# Patient Record
Sex: FEMALE | Race: WHITE | Employment: OTHER | URBAN - NONMETROPOLITAN AREA
[De-identification: names, ages, dates, MRNs, and addresses within clinical notes are randomized per-mention and may not be internally consistent; named-entity substitution may affect disease eponyms.]

---

## 2017-01-16 ENCOUNTER — TELEPHONE (OUTPATIENT)
Dept: FAMILY MEDICINE CLINIC | Facility: CLINIC | Age: 72
End: 2017-01-16

## 2017-01-16 NOTE — TELEPHONE ENCOUNTER
Calling the patient- we need a urine specimen. We do not what to treat with if we do not know what is showing in the urine.    The patient is really upset because she is caring for her brother currently who had his bladder removed and she is up at the [facility]

## 2017-01-17 ENCOUNTER — NURSE ONLY (OUTPATIENT)
Dept: FAMILY MEDICINE CLINIC | Facility: CLINIC | Age: 72
End: 2017-01-17

## 2017-01-17 DIAGNOSIS — R30.0 DYSURIA: Primary | ICD-10-CM

## 2017-01-17 LAB
MULTISTIX LOT#: NORMAL NUMERIC
PH, URINE: 6 (ref 4.5–8)
SPECIFIC GRAVITY: 1.01 (ref 1–1.03)
URINE-COLOR: YELLOW
UROBILINOGEN,SEMI-QN: 0.2 MG/DL (ref 0–1.9)

## 2017-01-17 PROCEDURE — 87186 SC STD MICRODIL/AGAR DIL: CPT | Performed by: FAMILY MEDICINE

## 2017-01-17 PROCEDURE — 87088 URINE BACTERIA CULTURE: CPT | Performed by: FAMILY MEDICINE

## 2017-01-17 PROCEDURE — 87086 URINE CULTURE/COLONY COUNT: CPT | Performed by: FAMILY MEDICINE

## 2017-01-17 PROCEDURE — 81003 URINALYSIS AUTO W/O SCOPE: CPT | Performed by: FAMILY MEDICINE

## 2017-01-17 RX ORDER — CEPHALEXIN 500 MG/1
500 CAPSULE ORAL 3 TIMES DAILY
Qty: 21 CAPSULE | Refills: 0 | Status: SHIPPED | OUTPATIENT
Start: 2017-01-17 | End: 2017-01-24

## 2017-01-25 RX ORDER — GABAPENTIN 400 MG/1
CAPSULE ORAL
Qty: 60 CAPSULE | Refills: 0 | Status: SHIPPED | OUTPATIENT
Start: 2017-01-25 | End: 2017-03-06

## 2017-01-31 ENCOUNTER — OFFICE VISIT (OUTPATIENT)
Dept: FAMILY MEDICINE CLINIC | Facility: CLINIC | Age: 72
End: 2017-01-31

## 2017-01-31 ENCOUNTER — HOSPITAL ENCOUNTER (OUTPATIENT)
Dept: GENERAL RADIOLOGY | Age: 72
Discharge: HOME | End: 2017-01-31
Attending: FAMILY MEDICINE
Payer: MEDICARE

## 2017-01-31 VITALS
HEIGHT: 64 IN | WEIGHT: 220.25 LBS | DIASTOLIC BLOOD PRESSURE: 80 MMHG | BODY MASS INDEX: 37.6 KG/M2 | TEMPERATURE: 98 F | SYSTOLIC BLOOD PRESSURE: 124 MMHG

## 2017-01-31 DIAGNOSIS — M48.061 LUMBAR SPINAL STENOSIS: Primary | ICD-10-CM

## 2017-01-31 DIAGNOSIS — M48.061 LUMBAR SPINAL STENOSIS: ICD-10-CM

## 2017-01-31 PROCEDURE — 72110 X-RAY EXAM L-2 SPINE 4/>VWS: CPT

## 2017-01-31 PROCEDURE — 99213 OFFICE O/P EST LOW 20 MIN: CPT | Performed by: FAMILY MEDICINE

## 2017-01-31 RX ORDER — OMEPRAZOLE 40 MG/1
CAPSULE, DELAYED RELEASE ORAL
Qty: 180 CAPSULE | Refills: 0 | OUTPATIENT
Start: 2017-01-31

## 2017-01-31 NOTE — PROGRESS NOTES
Cami Parks is a 66year old female. Patient presents with: Other: lower back pain since 1/17/17, pt injured back-wants to make sure there is no injury otherwise she will go see back doc. ...legs fall asleep since 1/17 with any pressure      HPI:   P TONSILLECTOMY      CHOLECYSTECTOMY      UPPER GI ENDOSCOPY,EXAM  2011    Comment Joy, GERD     Family History   Problem Relation Age of Onset   • Cancer Father      father  of colon cancer at age 61   • Heart Disease Mother      Mother  at a

## 2017-01-31 NOTE — PROGRESS NOTES
Quick Note:    Notify x-ray shows arthritic changes and postoperative changes. The hardware appears to be in good position.   Recommend evaluation by Dr. Sara Shell at THE [facility] if [facility] would like to get a another opinion  ______

## 2017-02-04 ENCOUNTER — TELEPHONE (OUTPATIENT)
Dept: FAMILY MEDICINE CLINIC | Facility: CLINIC | Age: 72
End: 2017-02-04

## 2017-02-04 NOTE — TELEPHONE ENCOUNTER
KYMBERLY HAD ANOTHER EPISODE LAST NIGHT, HER LEGS ALMOST WENT OUT FROM UNDER HER AND SHE FELT LIKE SHE HAD 2 HEADS, SHE GOT HER SELF CALMED DOWN, SHE JUST WOKE UP AND FEELS A LITTLE BIT BETTER BUT %, SHE WAS WONDERING WHAT ELSE COULD BE DONE, SHE DOES MANN

## 2017-02-04 NOTE — TELEPHONE ENCOUNTER
I agree. If it happens again she should go to the emergency room so tests could be run to try to get to the bottom of it.

## 2017-02-06 ENCOUNTER — OFFICE VISIT (OUTPATIENT)
Dept: FAMILY MEDICINE CLINIC | Facility: CLINIC | Age: 72
End: 2017-02-06

## 2017-02-06 ENCOUNTER — LAB ENCOUNTER (OUTPATIENT)
Dept: LAB | Age: 72
End: 2017-02-06
Attending: FAMILY MEDICINE
Payer: MEDICARE

## 2017-02-06 ENCOUNTER — MED REC SCAN ONLY (OUTPATIENT)
Dept: FAMILY MEDICINE CLINIC | Facility: CLINIC | Age: 72
End: 2017-02-06

## 2017-02-06 VITALS
BODY MASS INDEX: 37.22 KG/M2 | HEART RATE: 68 BPM | HEIGHT: 64 IN | WEIGHT: 218 LBS | TEMPERATURE: 98 F | RESPIRATION RATE: 16 BRPM | SYSTOLIC BLOOD PRESSURE: 110 MMHG | DIASTOLIC BLOOD PRESSURE: 78 MMHG

## 2017-02-06 DIAGNOSIS — R53.83 OTHER FATIGUE: ICD-10-CM

## 2017-02-06 DIAGNOSIS — R53.83 OTHER FATIGUE: Primary | ICD-10-CM

## 2017-02-06 DIAGNOSIS — M48.061 SPINAL STENOSIS OF LUMBAR REGION: ICD-10-CM

## 2017-02-06 LAB
ALBUMIN SERPL-MCNC: 4.2 G/DL (ref 3.5–4.8)
ALP LIVER SERPL-CCNC: 103 U/L (ref 55–142)
ALT SERPL-CCNC: 29 U/L (ref 14–54)
AST SERPL-CCNC: 19 U/L (ref 15–41)
BASOPHILS # BLD AUTO: 0.05 X10(3) UL (ref 0–0.1)
BASOPHILS NFR BLD AUTO: 0.7 %
BILIRUB SERPL-MCNC: 0.4 MG/DL (ref 0.1–2)
BUN BLD-MCNC: 20 MG/DL (ref 8–20)
CALCIUM BLD-MCNC: 9.1 MG/DL (ref 8.3–10.3)
CHLORIDE: 107 MMOL/L (ref 101–111)
CO2: 30 MMOL/L (ref 22–32)
CREAT BLD-MCNC: 0.95 MG/DL (ref 0.55–1.02)
EOSINOPHIL # BLD AUTO: 0.12 X10(3) UL (ref 0–0.3)
EOSINOPHIL NFR BLD AUTO: 1.8 %
ERYTHROCYTE [DISTWIDTH] IN BLOOD BY AUTOMATED COUNT: 14.4 % (ref 11.5–16)
GLUCOSE BLD-MCNC: 89 MG/DL (ref 70–99)
HCT VFR BLD AUTO: 44.8 % (ref 34–50)
HGB BLD-MCNC: 14.9 G/DL (ref 12–16)
IMMATURE GRANULOCYTE COUNT: 0.01 X10(3) UL (ref 0–1)
IMMATURE GRANULOCYTE RATIO %: 0.1 %
LYMPHOCYTES # BLD AUTO: 1.99 X10(3) UL (ref 0.9–4)
LYMPHOCYTES NFR BLD AUTO: 29.3 %
M PROTEIN MFR SERPL ELPH: 7.6 G/DL (ref 6.1–8.3)
MCH RBC QN AUTO: 31.4 PG (ref 27–33.2)
MCHC RBC AUTO-ENTMCNC: 33.3 G/DL (ref 31–37)
MCV RBC AUTO: 94.3 FL (ref 81–100)
MONOCYTES # BLD AUTO: 0.58 X10(3) UL (ref 0.1–0.6)
MONOCYTES NFR BLD AUTO: 8.5 %
NEUTROPHIL ABS PRELIM: 4.05 X10 (3) UL (ref 1.3–6.7)
NEUTROPHILS # BLD AUTO: 4.05 X10(3) UL (ref 1.3–6.7)
NEUTROPHILS NFR BLD AUTO: 59.6 %
PLATELET # BLD AUTO: 276 10(3)UL (ref 150–450)
POTASSIUM SERPL-SCNC: 5.3 MMOL/L (ref 3.6–5.1)
RBC # BLD AUTO: 4.75 X10(6)UL (ref 3.8–5.1)
RED CELL DISTRIBUTION WIDTH-SD: 50.1 FL (ref 35.1–46.3)
SED RATE-ML: 9 MM/HR (ref 0–25)
SODIUM SERPL-SCNC: 141 MMOL/L (ref 136–144)
TSI SER-ACNC: 2.19 MIU/ML (ref 0.35–5.5)
WBC # BLD AUTO: 6.8 X10(3) UL (ref 4–13)

## 2017-02-06 PROCEDURE — 85652 RBC SED RATE AUTOMATED: CPT

## 2017-02-06 PROCEDURE — 99214 OFFICE O/P EST MOD 30 MIN: CPT | Performed by: FAMILY MEDICINE

## 2017-02-06 PROCEDURE — 84443 ASSAY THYROID STIM HORMONE: CPT

## 2017-02-06 PROCEDURE — 36415 COLL VENOUS BLD VENIPUNCTURE: CPT

## 2017-02-06 PROCEDURE — 80053 COMPREHEN METABOLIC PANEL: CPT

## 2017-02-06 PROCEDURE — 85025 COMPLETE CBC W/AUTO DIFF WBC: CPT

## 2017-02-06 RX ORDER — A/SINGAPORE/GP1908/2015 IVR-180 (H1N1) (AN A/MICHIGAN/45/2015 (H1N1)PDM09-LIKE VIRUS), A/HONG KONG/4801/2014, NYMC X-263B (H3N2) (AN A/HONG KONG/4801/2014-LIKE VIRUS), AND B/BRISBANE/60/2008, WILD TYPE (A B/BRISBANE/60/2008-LIKE VIRUS) 15; 15; 15 UG/.5ML; UG/.5ML; UG/.5ML
INJECTION, SUSPENSION INTRAMUSCULAR
Refills: 0 | COMMUNITY
Start: 2016-11-30 | End: 2017-02-14 | Stop reason: ALTCHOICE

## 2017-02-06 NOTE — PROGRESS NOTES
Yanelis Puente is a 66year old female. Patient presents with: Other: chronic back pain, sx have been gettign worst in th epasst 3 weeks. . room 1      HPI:   Patient complains of increasing  back pain. She was seen last week.   X-rays revealed postoper Heart Disease Mother      Mother  at age 80 of CAD    • Cancer Paternal Grandfather      Rectal cancer        Social History:    Smoking Status: Never Smoker                      Smokeless Status: Never Used                        Alcohol Use: Yes (Automated) [E]    Meds & Refills for this Visit:  No prescriptions requested or ordered in this encounter       Imaging & Consults:  None

## 2017-02-07 ENCOUNTER — TELEPHONE (OUTPATIENT)
Dept: FAMILY MEDICINE CLINIC | Facility: CLINIC | Age: 72
End: 2017-02-07

## 2017-02-07 NOTE — PROGRESS NOTES
Quick Note:    All of her labs including CBC, thyroid, chemistry profile and inflammatory markers are normal. Recommend she make an appointment with Dr. Marge Cordero regarding her back pain.  ______

## 2017-02-17 ENCOUNTER — TELEPHONE (OUTPATIENT)
Dept: FAMILY MEDICINE CLINIC | Facility: CLINIC | Age: 72
End: 2017-02-17

## 2017-02-24 RX ORDER — SODIUM CHLORIDE 9 MG/ML
INJECTION, SOLUTION INTRAVENOUS CONTINUOUS
Status: CANCELLED | OUTPATIENT
Start: 2017-02-24

## 2017-02-24 RX ORDER — DIAZEPAM 5 MG/1
10 TABLET ORAL
Status: CANCELLED | OUTPATIENT
Start: 2017-02-25 | End: 2017-02-25

## 2017-02-27 ENCOUNTER — HOSPITAL ENCOUNTER (OUTPATIENT)
Dept: CT IMAGING | Facility: HOSPITAL | Age: 72
Discharge: HOME | End: 2017-02-27
Attending: PHYSICIAN ASSISTANT
Payer: MEDICARE

## 2017-02-27 ENCOUNTER — APPOINTMENT (OUTPATIENT)
Dept: LAB | Facility: HOSPITAL | Age: 72
End: 2017-02-27
Attending: PHYSICIAN ASSISTANT
Payer: MEDICARE

## 2017-02-27 ENCOUNTER — HOSPITAL ENCOUNTER (OUTPATIENT)
Dept: GENERAL RADIOLOGY | Facility: HOSPITAL | Age: 72
Discharge: HOME | End: 2017-02-27
Attending: PHYSICIAN ASSISTANT
Payer: MEDICARE

## 2017-02-27 VITALS
SYSTOLIC BLOOD PRESSURE: 123 MMHG | BODY MASS INDEX: 37.56 KG/M2 | WEIGHT: 220 LBS | HEART RATE: 72 BPM | TEMPERATURE: 98 F | OXYGEN SATURATION: 93 % | RESPIRATION RATE: 16 BRPM | HEIGHT: 64 IN | DIASTOLIC BLOOD PRESSURE: 72 MMHG

## 2017-02-27 VITALS
OXYGEN SATURATION: 100 % | RESPIRATION RATE: 16 BRPM | DIASTOLIC BLOOD PRESSURE: 86 MMHG | HEART RATE: 75 BPM | SYSTOLIC BLOOD PRESSURE: 145 MMHG

## 2017-02-27 DIAGNOSIS — M51.36 DDD (DEGENERATIVE DISC DISEASE), LUMBAR: ICD-10-CM

## 2017-02-27 DIAGNOSIS — Z98.1 S/P LUMBAR SPINAL FUSION: ICD-10-CM

## 2017-02-27 DIAGNOSIS — M48.061 LUMBAR STENOSIS: ICD-10-CM

## 2017-02-27 DIAGNOSIS — M48.061 LUMBAR STENOSIS: Primary | ICD-10-CM

## 2017-02-27 LAB
INR BLD: 0.88 (ref 0.89–1.12)
PSA SERPL DL<=0.01 NG/ML-MCNC: 12.2 SECONDS (ref 12.3–14.8)

## 2017-02-27 PROCEDURE — 36415 COLL VENOUS BLD VENIPUNCTURE: CPT

## 2017-02-27 PROCEDURE — 72132 CT LUMBAR SPINE W/DYE: CPT

## 2017-02-27 PROCEDURE — 85610 PROTHROMBIN TIME: CPT

## 2017-02-27 PROCEDURE — 62304 MYELOGRAPHY LUMBAR INJECTION: CPT | Performed by: RADIOLOGY

## 2017-02-27 RX ORDER — SODIUM CHLORIDE 9 MG/ML
INJECTION, SOLUTION INTRAVENOUS CONTINUOUS
Status: DISCONTINUED | OUTPATIENT
Start: 2017-02-27 | End: 2017-03-03

## 2017-02-27 RX ORDER — DIAZEPAM 5 MG/1
10 TABLET ORAL
Status: COMPLETED | OUTPATIENT
Start: 2017-02-27 | End: 2017-02-27

## 2017-02-27 RX ORDER — DIAZEPAM 5 MG/1
TABLET ORAL
Status: COMPLETED
Start: 2017-02-27 | End: 2017-02-27

## 2017-02-27 RX ADMIN — DIAZEPAM 10 MG: 5 TABLET ORAL at 09:59:00

## 2017-03-06 RX ORDER — GABAPENTIN 400 MG/1
CAPSULE ORAL
Qty: 60 CAPSULE | Refills: 0 | Status: SHIPPED | OUTPATIENT
Start: 2017-03-06 | End: 2017-03-06

## 2017-03-07 RX ORDER — GABAPENTIN 400 MG/1
CAPSULE ORAL
Qty: 180 CAPSULE | Refills: 0 | Status: SHIPPED | OUTPATIENT
Start: 2017-03-07 | End: 2017-05-02

## 2017-03-17 ENCOUNTER — MED REC SCAN ONLY (OUTPATIENT)
Dept: FAMILY MEDICINE CLINIC | Facility: CLINIC | Age: 72
End: 2017-03-17

## 2017-03-25 ENCOUNTER — TELEPHONE (OUTPATIENT)
Dept: FAMILY MEDICINE CLINIC | Facility: CLINIC | Age: 72
End: 2017-03-25

## 2017-03-25 NOTE — TELEPHONE ENCOUNTER
Pt is scheduled for an appointment scheduled Monday but wants to know what she can take OTC.  C/O cough, stuffiness, hoars, green phlegm  Advised Dr. Fab Chicas: v/o Mucinex D, Nasal saline, and ibuprofen as needed  Patient notified and verbalized understanding

## 2017-03-27 ENCOUNTER — OFFICE VISIT (OUTPATIENT)
Dept: FAMILY MEDICINE CLINIC | Facility: CLINIC | Age: 72
End: 2017-03-27

## 2017-03-27 VITALS
WEIGHT: 221 LBS | BODY MASS INDEX: 37.73 KG/M2 | OXYGEN SATURATION: 96 % | TEMPERATURE: 99 F | HEIGHT: 64 IN | HEART RATE: 94 BPM | SYSTOLIC BLOOD PRESSURE: 110 MMHG | DIASTOLIC BLOOD PRESSURE: 70 MMHG

## 2017-03-27 DIAGNOSIS — J01.10 ACUTE NON-RECURRENT FRONTAL SINUSITIS: Primary | ICD-10-CM

## 2017-03-27 PROCEDURE — 99213 OFFICE O/P EST LOW 20 MIN: CPT | Performed by: FAMILY MEDICINE

## 2017-03-27 RX ORDER — AMOXICILLIN AND CLAVULANATE POTASSIUM 875; 125 MG/1; MG/1
1 TABLET, FILM COATED ORAL 2 TIMES DAILY
Qty: 20 TABLET | Refills: 0 | Status: SHIPPED | OUTPATIENT
Start: 2017-03-27 | End: 2017-04-06

## 2017-03-27 RX ORDER — METHYLPREDNISOLONE 4 MG/1
TABLET ORAL
Qty: 1 KIT | Refills: 0 | Status: SHIPPED | OUTPATIENT
Start: 2017-03-27 | End: 2017-06-06 | Stop reason: ALTCHOICE

## 2017-03-27 NOTE — PROGRESS NOTES
Clement Hdez is a 66year old female. Patient presents with: Other: sneezing, green/yellow mucus, head/chest congestion, cough,a lot of soreness from coughing, feels very warm a lot. ..started on 3/21 and got very bad on 3/26. Amy Mac pt has been taking Tuss Mother  at age 80 of CAD    • Cancer Paternal Grandfather      Rectal cancer        Social History:    Smoking Status: Never Smoker                      Smokeless Status: Never Used                        Alcohol Use: Yes           0.0 oz/week purulent appearing nasal drainage from the sinus ostia. No orders of the defined types were placed in this encounter.        Meds & Refills for this Visit:  Signed Prescriptions Disp Refills    Amoxicillin-Pot Clavulanate 875-125 MG Oral Tab 20 tablet 0

## 2017-04-04 RX ORDER — GABAPENTIN 400 MG/1
CAPSULE ORAL
Qty: 60 CAPSULE | Refills: 0 | OUTPATIENT
Start: 2017-04-04

## 2017-05-02 RX ORDER — GABAPENTIN 400 MG/1
CAPSULE ORAL
Qty: 60 CAPSULE | Refills: 0 | OUTPATIENT
Start: 2017-05-02

## 2017-05-02 RX ORDER — GABAPENTIN 400 MG/1
CAPSULE ORAL
Qty: 180 CAPSULE | Refills: 0 | Status: SHIPPED | OUTPATIENT
Start: 2017-05-02 | End: 2017-07-18

## 2017-05-02 NOTE — TELEPHONE ENCOUNTER
patient is requesting a quantity increase of her gabapentin- she would like to get #120 at a time if possible, Wes should be contacting us regarding refill

## 2017-06-06 ENCOUNTER — OFFICE VISIT (OUTPATIENT)
Dept: FAMILY MEDICINE CLINIC | Facility: CLINIC | Age: 72
End: 2017-06-06

## 2017-06-06 VITALS
TEMPERATURE: 98 F | DIASTOLIC BLOOD PRESSURE: 76 MMHG | BODY MASS INDEX: 38.09 KG/M2 | SYSTOLIC BLOOD PRESSURE: 120 MMHG | WEIGHT: 223.13 LBS | HEART RATE: 80 BPM | HEIGHT: 64 IN | OXYGEN SATURATION: 95 %

## 2017-06-06 DIAGNOSIS — Z13.31 DEPRESSION SCREENING: ICD-10-CM

## 2017-06-06 DIAGNOSIS — Z00.00 ENCOUNTER FOR ANNUAL HEALTH EXAMINATION: Primary | ICD-10-CM

## 2017-06-06 PROCEDURE — G0439 PPPS, SUBSEQ VISIT: HCPCS | Performed by: FAMILY MEDICINE

## 2017-06-06 PROCEDURE — G0444 DEPRESSION SCREEN ANNUAL: HCPCS | Performed by: FAMILY MEDICINE

## 2017-06-06 NOTE — PATIENT INSTRUCTIONS
Amy Bergman's SCREENING SCHEDULE   Tests on this list are recommended by your physician but may not be covered, or covered at this frequency, by your insurer. Please check with your insurance carrier before scheduling to verify coverage.    SHEKHAR years- more often if abnormal Colonoscopy,5 Years due on 07/22/2018 Update [facility] if applicable    Flex Sigmoidoscopy Screen  Covered every 5 years No results found for this or any previous visit. No flowsheet data found.      Fecal Occult Blood 06/03/16  -PNEUMOCOCCAL VACC, 13 [facility]    Please get once after your 65th birthday    Pneumococcal 23 (Pneumovax)  Covered Once after 65 No orders found for this or any previous visit.  Please get once after your 65th birthday    Hepatitis B for Moderate/H

## 2017-06-06 NOTE — PROGRESS NOTES
HPI:   Amandeep Serrano is a 67year old female who presents for a Medicare Subsequent Annual Wellness visit (Pt already had Initial Annual Wellness).     No complaints    Her last annual assessment has been over 1 year: Annual Physical due on 06/03/2017 grandfather; Heart Disease in her mother. SOCIAL HISTORY:   She  reports that she has never smoked. She has never used smokeless tobacco. She reports that she drinks alcohol. She reports that she does not use illicit drugs.      REVIEW OF SYSTEMS:   GENER examination    Depression screening  -     Depression Screening (Medicare, Annual) []       Ms. Sowmya Valentine does not currently take aspirin. We discussed the risks and benefits of aspirin therapy.    Mariah Ross is unable to use daily aspirin therapy Fo afford your medications?: Yes    Hearing Problems?: No     Functional Status     Hearing Problems?: No    Vision Problems? : No    Difficulty walking?: Yes (back pain)    Difficulty dressing or bathing?: No    Problems with daily activities? : No    Memory Disease Screening     LDL Annually LDL CHOLESTROL (mg/dL)   Date Value   05/23/2014 138*        EKG - w/ Initial Preventative Physical Exam only, or if medically necessary Electrocardiogram date       Colorectal Cancer Screening      Colonoscopy Screen jory in the same house as a HepB virus carrier   Homosexual men   Illicit injectable drug abusers     Tetanus Toxoid  Only covered with a cut with metal- TD and TDaP Not covered by Medicare Part B No vaccine history found This may be covered with your prescript

## 2017-06-07 ENCOUNTER — MED REC SCAN ONLY (OUTPATIENT)
Dept: FAMILY MEDICINE CLINIC | Facility: CLINIC | Age: 72
End: 2017-06-07

## 2017-06-08 ENCOUNTER — TELEPHONE (OUTPATIENT)
Dept: FAMILY MEDICINE CLINIC | Facility: CLINIC | Age: 72
End: 2017-06-08

## 2017-06-08 DIAGNOSIS — N63.10 BREAST MASS, RIGHT: Primary | ICD-10-CM

## 2017-06-08 NOTE — TELEPHONE ENCOUNTER
Fax received from [phone] [place] [address] [address] stating \"Notification of incomplete order. Pt due for merrill diag & R US. Can we get a order for Diag Merrill mammo and Right US. Pt due for Merrill. \"  Right breast ultrasound and 3-D Breast sonography order was sent with Dx: F/u

## 2017-06-09 ENCOUNTER — MED REC SCAN ONLY (OUTPATIENT)
Dept: FAMILY MEDICINE CLINIC | Facility: CLINIC | Age: 72
End: 2017-06-09

## 2017-06-14 ENCOUNTER — TELEPHONE (OUTPATIENT)
Dept: FAMILY MEDICINE CLINIC | Facility: CLINIC | Age: 72
End: 2017-06-14

## 2017-06-14 NOTE — TELEPHONE ENCOUNTER
KYMBERLY WAS WONDERING IF SHE COULD STILL GET AN ULCER ON HER MEDICATION: OMEPRAZOLE 40 MG Oral Capsule Delayed Release       She is having irritation. She takes her Omeprazole daily. Once in awhile she will take BID  In her esophagus area it feels irritated.  [place]

## 2017-06-14 NOTE — TELEPHONE ENCOUNTER
She really needs to be seen by GI especially if she is having symptoms despite taking the omeprazole every day.

## 2017-06-14 NOTE — TELEPHONE ENCOUNTER
I called the patient and advised   She advised that she wants to find a doctor in [facility] or [place]. The drive is too far for [place] and she doesn't drive in congested areas. She would need to find someone to drive her.    She is going to try the medicine

## 2017-07-03 ENCOUNTER — TELEPHONE (OUTPATIENT)
Dept: FAMILY MEDICINE CLINIC | Facility: CLINIC | Age: 72
End: 2017-07-03

## 2017-07-03 NOTE — TELEPHONE ENCOUNTER
Pt has a question about her mammogram results and a test that they want her to have. Would like to speak to RN.

## 2017-07-03 NOTE — TELEPHONE ENCOUNTER
Calling the patient-     Friday she went to [facility] for her mammogram and they want her to have a biopsy of the right breast.   She has had to have repeat mammograms every 6 months and they state that nothing changes     The radiologist is recommending this ti

## 2017-07-06 NOTE — TELEPHONE ENCOUNTER
Notify radiologist recommends eval by surgeon for breast biopsy. Rec eval by Dr. Neha Hawley. V/o Dr. Junior Ruth. Left message for patient to call back.

## 2017-07-06 NOTE — TELEPHONE ENCOUNTER
Patient advised of below and verbalizes understanding. States they are planning to do the biopsy at [facility]. She will have results sent here.

## 2017-07-08 ENCOUNTER — TELEPHONE (OUTPATIENT)
Dept: FAMILY MEDICINE CLINIC | Facility: CLINIC | Age: 72
End: 2017-07-08

## 2017-07-08 DIAGNOSIS — R92.8 ABNORMAL MAMMOGRAM OF RIGHT BREAST: Primary | ICD-10-CM

## 2017-07-08 NOTE — TELEPHONE ENCOUNTER
CAN YOU PLEASE FAX AN ORDER FOR A BREAST BIOPSY TO [facility] IN [place] FAX NUMBER [phone]. THEY WANT TO DO THE BIOPSY ON Wednesday.

## 2017-07-15 ENCOUNTER — TELEPHONE (OUTPATIENT)
Dept: FAMILY MEDICINE CLINIC | Facility: CLINIC | Age: 72
End: 2017-07-15

## 2017-07-15 NOTE — TELEPHONE ENCOUNTER
Wants to know if we have received her biopsy results? Can wait until next week, just wants to make sure we receive them.

## 2017-07-17 ENCOUNTER — TELEPHONE (OUTPATIENT)
Dept: FAMILY MEDICINE CLINIC | Facility: CLINIC | Age: 72
End: 2017-07-17

## 2017-07-17 NOTE — TELEPHONE ENCOUNTER
The patient is looking for her biopsy results. .   She had at [facility] and we had to send the order for the biopsy.

## 2017-07-17 NOTE — TELEPHONE ENCOUNTER
I believe it showed carcinoma in situ but need to get a copy of the report to be sure before calling her back.

## 2017-07-17 NOTE — TELEPHONE ENCOUNTER
Calling [facility] for another copy of the biopsy results  [phone]    Left message for medical records.

## 2017-07-18 RX ORDER — GABAPENTIN 400 MG/1
CAPSULE ORAL
Qty: 180 CAPSULE | Refills: 0 | Status: SHIPPED | OUTPATIENT
Start: 2017-07-18 | End: 2017-10-16

## 2017-07-18 RX ORDER — OMEPRAZOLE 40 MG/1
CAPSULE, DELAYED RELEASE ORAL
Qty: 180 CAPSULE | Refills: 0 | Status: SHIPPED | OUTPATIENT
Start: 2017-07-18 | End: 2017-10-16

## 2017-07-18 NOTE — TELEPHONE ENCOUNTER
Deniz Stacy from [facility] faxed her results, please look for them.

## 2017-07-18 NOTE — TELEPHONE ENCOUNTER
Calling the patient to advise that the pathology was benign and she can resume her annual mammograms     Patient verbalized her understanding

## 2017-07-18 NOTE — TELEPHONE ENCOUNTER
Calling [facility] medical records-  I did not receive the biopsy results.    Had to leave a message again-     Calling to follow up with the patient so she knows that I am still waiting  She advised that she will call as well

## 2017-08-23 ENCOUNTER — TELEPHONE (OUTPATIENT)
Dept: FAMILY MEDICINE CLINIC | Facility: CLINIC | Age: 72
End: 2017-08-23

## 2017-08-23 RX ORDER — PREDNISONE 20 MG/1
20 TABLET ORAL 2 TIMES DAILY
Qty: 14 TABLET | Refills: 0 | Status: SHIPPED | OUTPATIENT
Start: 2017-08-23 | End: 2017-08-30

## 2017-08-23 NOTE — TELEPHONE ENCOUNTER
Pt got into some poison ivy and wants to know if she can get a prednisone pack? If so, please call into [facility] in [place], if not, call pt.

## 2017-09-18 ENCOUNTER — TELEPHONE (OUTPATIENT)
Dept: FAMILY MEDICINE CLINIC | Facility: CLINIC | Age: 72
End: 2017-09-18

## 2017-09-18 NOTE — TELEPHONE ENCOUNTER
Dr [facility]   The patient needs to see Dr Travon Betancourt

## 2017-09-19 NOTE — TELEPHONE ENCOUNTER
Calling the patient to advise the disc is ready  She advised that she will go this afternoon to pick this up

## 2017-09-19 NOTE — TELEPHONE ENCOUNTER
I spoke with the patient and advised that I called [facility] and left a message about needing to make a disc for her breast images     She is scheduled to see dr Joaquin Seip this Friday   Future Appointments  Date Time Provider Janie Moulton   9/22/2017 2:30 PM Ma

## 2017-09-22 ENCOUNTER — OFFICE VISIT (OUTPATIENT)
Dept: FAMILY MEDICINE CLINIC | Facility: CLINIC | Age: 72
End: 2017-09-22

## 2017-09-22 VITALS — BODY MASS INDEX: 38 KG/M2 | DIASTOLIC BLOOD PRESSURE: 80 MMHG | WEIGHT: 223 LBS | SYSTOLIC BLOOD PRESSURE: 124 MMHG

## 2017-09-22 DIAGNOSIS — R92.8 ABNORMAL MAMMOGRAM OF RIGHT BREAST: Primary | ICD-10-CM

## 2017-09-22 PROCEDURE — 99204 OFFICE O/P NEW MOD 45 MIN: CPT | Performed by: SURGERY

## 2017-09-22 NOTE — PROGRESS NOTES
Palpable-NO    MSBE-YES    AGE MENARCHE-14  AGE MENOPAUSE-43  TOTAL-28      AGE AT 1ST PREGNANCY-16  BCP- 11          HRT- 0    ETOH-OCCASIONAL  TOBACCO-NONE  CAFFEINE-3-5 CUPS A DAY  FAMILY HX OF BREAST,UTERINE,OR OVARIAN CA-SISTER POSSIBLE BREAST AN Sodium (ALEVE) 220 MG Oral Cap Take  by mouth 2 (two) times daily. Disp:  Rfl:      No current facility-administered medications on file prior to visit.    [de-identified]  Family History   Problem Relation Age of Onset   • Cancer Father      father  of colon can hernias  LYMPHATIC: no axillary , supraclavicular or inguinal lymphadenopathy  EXTREMITIES: no cyanosis, clubbing or edema, no atrophy, full ROM  Breasts no masses right or left axilla no masses neck no masses  STUDIES:     Appointment on 02/27/2017   Comp

## 2017-09-25 ENCOUNTER — TELEPHONE (OUTPATIENT)
Dept: FAMILY MEDICINE CLINIC | Facility: CLINIC | Age: 72
End: 2017-09-25

## 2017-09-25 ENCOUNTER — TELEPHONE (OUTPATIENT)
Dept: SURGERY | Facility: CLINIC | Age: 72
End: 2017-09-25

## 2017-09-25 ENCOUNTER — MED REC SCAN ONLY (OUTPATIENT)
Dept: FAMILY MEDICINE CLINIC | Facility: CLINIC | Age: 72
End: 2017-09-25

## 2017-09-25 DIAGNOSIS — D05.01 NEOPLASM OF RIGHT BREAST, PRIMARY TUMOR STAGING CATEGORY TIS: LOBULAR CARCINOMA IN SITU (LCIS): Primary | ICD-10-CM

## 2017-09-25 NOTE — TELEPHONE ENCOUNTER
I spoke to Rafa and we were able to schedule the pt for 10/6/17 as another pt r/s his procedure.  Pt aware and v/u. matty

## 2017-09-25 NOTE — TELEPHONE ENCOUNTER
Received call from [facility] surgery scheduler [facility] doesn't have availability on 10/2/17. Per [facility] there is time on 9/27/17. [facility] aware I will call the pt and call her back.     I spoke to the pt and she is aware the [facility] doesn't have availability on 10/

## 2017-09-27 ENCOUNTER — TELEPHONE (OUTPATIENT)
Dept: FAMILY MEDICINE CLINIC | Facility: CLINIC | Age: 72
End: 2017-09-27

## 2017-10-05 ENCOUNTER — TELEPHONE (OUTPATIENT)
Dept: MAMMOGRAPHY | Facility: HOSPITAL | Age: 72
End: 2017-10-05

## 2017-10-05 ENCOUNTER — ANESTHESIA EVENT (OUTPATIENT)
Dept: SURGERY | Facility: HOSPITAL | Age: 72
End: 2017-10-05
Payer: MEDICARE

## 2017-10-05 NOTE — TELEPHONE ENCOUNTER
Spoke with patient regarding needle Localization process of breast for lumpectomy due tomorrow. Procedure explained and all questions answered. Pt to be escorted via W[facility]C through [address] [facility] [address] [facility]. Pt verbalized understanding.

## 2017-10-05 NOTE — ANESTHESIA PREPROCEDURE EVALUATION
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                Airway      Mallampati: III  Mouth opening: >3 FB  TM distance: 4 - 6 cm  Neck ROM: full Cardiovascular    Cardiovascular exam normal.  Rhythm: regular  Rate: normal  (-) murmur   Dental

## 2017-10-06 ENCOUNTER — ANESTHESIA (OUTPATIENT)
Dept: SURGERY | Facility: HOSPITAL | Age: 72
End: 2017-10-06
Payer: MEDICARE

## 2017-10-06 ENCOUNTER — HOSPITAL ENCOUNTER (OUTPATIENT)
Dept: MAMMOGRAPHY | Facility: HOSPITAL | Age: 72
Discharge: HOME | End: 2017-10-06
Attending: SURGERY
Payer: MEDICARE

## 2017-10-06 ENCOUNTER — SURGERY (OUTPATIENT)
Age: 72
End: 2017-10-06

## 2017-10-06 ENCOUNTER — HOSPITAL ENCOUNTER (OUTPATIENT)
Facility: HOSPITAL | Age: 72
Setting detail: HOSPITAL OUTPATIENT SURGERY
Discharge: HOME | End: 2017-10-06
Attending: SURGERY | Admitting: SURGERY
Payer: MEDICARE

## 2017-10-06 VITALS
OXYGEN SATURATION: 96 % | HEART RATE: 75 BPM | BODY MASS INDEX: 36.99 KG/M2 | TEMPERATURE: 98 F | DIASTOLIC BLOOD PRESSURE: 71 MMHG | SYSTOLIC BLOOD PRESSURE: 127 MMHG | WEIGHT: 216.69 LBS | HEIGHT: 64 IN | RESPIRATION RATE: 20 BRPM

## 2017-10-06 DIAGNOSIS — D05.01 NEOPLASM OF RIGHT BREAST, PRIMARY TUMOR STAGING CATEGORY TIS: LOBULAR CARCINOMA IN SITU (LCIS): ICD-10-CM

## 2017-10-06 PROCEDURE — 88305 TISSUE EXAM BY PATHOLOGIST: CPT | Performed by: SURGERY

## 2017-10-06 PROCEDURE — 19281 PERQ DEVICE BREAST 1ST IMAG: CPT | Performed by: SURGERY

## 2017-10-06 PROCEDURE — 0HBT0ZX EXCISION OF RIGHT BREAST, OPEN APPROACH, DIAGNOSTIC: ICD-10-PCS | Performed by: SURGERY

## 2017-10-06 PROCEDURE — 76098 X-RAY EXAM SURGICAL SPECIMEN: CPT | Performed by: SURGERY

## 2017-10-06 RX ORDER — ONDANSETRON 2 MG/ML
4 INJECTION INTRAMUSCULAR; INTRAVENOUS ONCE AS NEEDED
Status: DISCONTINUED | OUTPATIENT
Start: 2017-10-06 | End: 2017-10-06

## 2017-10-06 RX ORDER — BUPIVACAINE HYDROCHLORIDE 5 MG/ML
INJECTION, SOLUTION EPIDURAL; INTRACAUDAL AS NEEDED
Status: DISCONTINUED | OUTPATIENT
Start: 2017-10-06 | End: 2017-10-06 | Stop reason: HOSPADM

## 2017-10-06 RX ORDER — HYDROCODONE BITARTRATE AND ACETAMINOPHEN 5; 325 MG/1; MG/1
1 TABLET ORAL AS NEEDED
Status: DISCONTINUED | OUTPATIENT
Start: 2017-10-06 | End: 2017-10-06

## 2017-10-06 RX ORDER — METOCLOPRAMIDE HYDROCHLORIDE 5 MG/ML
10 INJECTION INTRAMUSCULAR; INTRAVENOUS AS NEEDED
Status: DISCONTINUED | OUTPATIENT
Start: 2017-10-06 | End: 2017-10-06

## 2017-10-06 RX ORDER — NALOXONE HYDROCHLORIDE 0.4 MG/ML
80 INJECTION, SOLUTION INTRAMUSCULAR; INTRAVENOUS; SUBCUTANEOUS AS NEEDED
Status: DISCONTINUED | OUTPATIENT
Start: 2017-10-06 | End: 2017-10-06

## 2017-10-06 RX ORDER — CEFAZOLIN SODIUM 1 G/3ML
INJECTION, POWDER, FOR SOLUTION INTRAMUSCULAR; INTRAVENOUS
Status: DISCONTINUED | OUTPATIENT
Start: 2017-10-06 | End: 2017-10-06 | Stop reason: HOSPADM

## 2017-10-06 RX ORDER — HYDROCODONE BITARTRATE AND ACETAMINOPHEN 5; 325 MG/1; MG/1
1 TABLET ORAL EVERY 6 HOURS PRN
Qty: 30 TABLET | Refills: 0 | Status: SHIPPED | OUTPATIENT
Start: 2017-10-06 | End: 2017-10-27 | Stop reason: ALTCHOICE

## 2017-10-06 RX ORDER — HYDROCODONE BITARTRATE AND ACETAMINOPHEN 5; 325 MG/1; MG/1
2 TABLET ORAL AS NEEDED
Status: DISCONTINUED | OUTPATIENT
Start: 2017-10-06 | End: 2017-10-06

## 2017-10-06 RX ORDER — DIAZEPAM 5 MG/1
5 TABLET ORAL AS NEEDED
Status: DISCONTINUED | OUTPATIENT
Start: 2017-10-06 | End: 2017-10-06 | Stop reason: HOSPADM

## 2017-10-06 RX ORDER — HYDROMORPHONE HYDROCHLORIDE 1 MG/ML
0.4 INJECTION, SOLUTION INTRAMUSCULAR; INTRAVENOUS; SUBCUTANEOUS EVERY 5 MIN PRN
Status: DISCONTINUED | OUTPATIENT
Start: 2017-10-06 | End: 2017-10-06

## 2017-10-06 RX ORDER — HYDROMORPHONE HYDROCHLORIDE 1 MG/ML
INJECTION, SOLUTION INTRAMUSCULAR; INTRAVENOUS; SUBCUTANEOUS
Status: COMPLETED
Start: 2017-10-06 | End: 2017-10-06

## 2017-10-06 RX ORDER — SODIUM CHLORIDE, SODIUM LACTATE, POTASSIUM CHLORIDE, CALCIUM CHLORIDE 600; 310; 30; 20 MG/100ML; MG/100ML; MG/100ML; MG/100ML
INJECTION, SOLUTION INTRAVENOUS CONTINUOUS
Status: DISCONTINUED | OUTPATIENT
Start: 2017-10-06 | End: 2017-10-06

## 2017-10-06 RX ORDER — LIDOCAINE HYDROCHLORIDE AND EPINEPHRINE 10; 10 MG/ML; UG/ML
INJECTION, SOLUTION INFILTRATION; PERINEURAL AS NEEDED
Status: DISCONTINUED | OUTPATIENT
Start: 2017-10-06 | End: 2017-10-06 | Stop reason: HOSPADM

## 2017-10-06 NOTE — BRIEF OP NOTE
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

## 2017-10-06 NOTE — H&P
Reason for Visit     Consult Dr Karine Castro.  Consult for abnl mammo and stereotactic bx-R breast   Progress Notes        Palpable-NO     MSBE-YES     AGE MENARCHE-14  AGE MENOPAUSE-43  TOTAL-28       AGE AT 1ST PREGNANCY-16  BCP- 11          HRT- 0    MOUTH TWICE DAILY Disp: 180 capsule Rfl: 0   GABAPENTIN 400 MG Oral Cap TAKE ONE CAPSULE BY MOUTH TWICE DAILY Disp: 180 capsule Rfl: 0   Naproxen Sodium (ALEVE) 220 MG Oral Cap Take  by mouth 2 (two) times daily.  Disp:  Rfl:       No current facility-admin clear to auscultation, no rales , rhonchi or wheezing  CARDIOVASCULAR: RRR, murmur negative  ABDOMEN: normal active BS, soft, nondistended  no HSM, no masses or hernias  LYMPHATIC: no axillary , supraclavicular or inguinal lymphadenopathy  EXTREMITIES: no

## 2017-10-06 NOTE — OPERATIVE REPORT
[facility]    PATIENT'S NAME: Alejo, [place] FLORENTIN   ATTENDING PHYSICIAN: Davin Couch D.O.   OPERATING PHYSICIAN: Davin Couch D.O.   PATIENT ACCOUNT#:   [de-identified]    LOCATION:  [address] [place] [address] [address]  MEDICAL RECORD #:   [id number]       D applied. The patient was transported from the operative suite to recovery in stable condition.     Dictated By Elisa Harris D.O.  d: 10/06/2017 11:09:34  t: 10/06/2017 13:49:24  [facility] [id number]  /    cc: Brandi Cruz D.O.

## 2017-10-06 NOTE — ANESTHESIA POSTPROCEDURE EVALUATION
[facility] Patient Status:  Hospital Outpatient Surgery   Age/Gender 67year old female MRN [id number]   [facility] SURGERY Attending Frida Gould, [facility] Day # 0 PCP Cyndi Escalona DO       Anesthesia Post-op

## 2017-10-06 NOTE — IMAGING NOTE
Assisted Dr. Flora Cuellar with needle localization for breast for lumpectomy. Procedure explained and all questions answered. Pt verbalized understanding. Emotional support provided and pt tolerated procedure well with minimal discomfort.  Gauze dressing placed a

## 2017-10-09 ENCOUNTER — TELEPHONE (OUTPATIENT)
Dept: FAMILY MEDICINE CLINIC | Facility: CLINIC | Age: 72
End: 2017-10-09

## 2017-10-09 NOTE — TELEPHONE ENCOUNTER
HAD PROCEDURE Friday WITH DR Munir Hernandez STILL FEELS VERY WEAK AND FATIGUED. COULD IT STILL BE EFFECTS FROM ANESTHESIA?   PLEASE ADVISE

## 2017-10-09 NOTE — TELEPHONE ENCOUNTER
Patient had procedure last Friday. Her instructions state that she is to follow up with Dr Viri Galvan in 5 days.   There are no available appointments

## 2017-10-09 NOTE — TELEPHONE ENCOUNTER
Pt states she tried to go out into the yard and do some work but feels weak and fatigued and wanted to know if it would be from the anesthesia? Pt aware the anesthesia would be out of her system by now. Pt aware it could be from the pain medication.  Pt ethan

## 2017-10-09 NOTE — TELEPHONE ENCOUNTER
I spoke to the pt and she is aware Dr Gisselle Herrera is out of the office this week and will be back in office on 10/17/17.  Pt v/u and made an appt for that day. matty    Future Appointments  Date Time Provider Janie Moulton   10/17/2017 1:15 PM ELI Glynn

## 2017-10-17 ENCOUNTER — OFFICE VISIT (OUTPATIENT)
Dept: FAMILY MEDICINE CLINIC | Facility: CLINIC | Age: 72
End: 2017-10-17

## 2017-10-17 VITALS
HEART RATE: 68 BPM | DIASTOLIC BLOOD PRESSURE: 78 MMHG | SYSTOLIC BLOOD PRESSURE: 118 MMHG | TEMPERATURE: 98 F | RESPIRATION RATE: 16 BRPM | HEIGHT: 64 IN | BODY MASS INDEX: 38.07 KG/M2 | WEIGHT: 223 LBS

## 2017-10-17 DIAGNOSIS — N63.10 BREAST MASS, RIGHT: Primary | ICD-10-CM

## 2017-10-17 PROCEDURE — 99024 POSTOP FOLLOW-UP VISIT: CPT | Performed by: SURGERY

## 2017-10-17 RX ORDER — OMEPRAZOLE 40 MG/1
CAPSULE, DELAYED RELEASE ORAL
Qty: 180 CAPSULE | Refills: 0 | Status: SHIPPED | OUTPATIENT
Start: 2017-10-17 | End: 2017-12-22

## 2017-10-17 RX ORDER — GABAPENTIN 400 MG/1
CAPSULE ORAL
Qty: 180 CAPSULE | Refills: 0 | Status: SHIPPED | OUTPATIENT
Start: 2017-10-17 | End: 2017-12-22

## 2017-10-23 ENCOUNTER — TELEPHONE (OUTPATIENT)
Dept: FAMILY MEDICINE CLINIC | Facility: CLINIC | Age: 72
End: 2017-10-23

## 2017-10-23 NOTE — TELEPHONE ENCOUNTER
DR Kay Patel DID SURGERY 10/6/17 ON KYMBERLY'S RIGHT BREAST, SHE HAS A TRIANGLE SHAPE HOLE IN HER BREAST, IT IS LEAKING.   HE DID NOT PUT A DRAIN IN IT.  SHE DID [address] SPOKE WITH KYMBERLY, SHE TOLD HER TO USE STERILE BAND AIDS OVER IT UN

## 2017-10-23 NOTE — TELEPHONE ENCOUNTER
Continue to wash with soap and water daily. Cover with gauze. Office visit some time this week. V/o Dr. Cari Nichols  Patient advised and verbalizes understanding. Appointment scheduled.   Future Appointments  Date Time Provider Janie Moulton   10/27/2017

## 2017-10-23 NOTE — TELEPHONE ENCOUNTER
Started with small amount of drainage Friday. By Saturday draining had increased a lot. Covering with sterile pad after cleaning with peroxide. Drainage is blood-tinged. No odor. Had to change the dressing 3 times yesterday. No fevers.   Not warm to t

## 2017-10-23 NOTE — PROGRESS NOTES
Patient status post right breast needle localized biopsy for proliferative breast lesion final pathology demonstrated further LCIS no invasive component incision is healing well no evidence of erythema induration or infection plan follow-up as needed

## 2017-10-27 ENCOUNTER — OFFICE VISIT (OUTPATIENT)
Dept: FAMILY MEDICINE CLINIC | Facility: CLINIC | Age: 72
End: 2017-10-27

## 2017-10-27 VITALS
HEART RATE: 94 BPM | SYSTOLIC BLOOD PRESSURE: 120 MMHG | OXYGEN SATURATION: 96 % | BODY MASS INDEX: 38 KG/M2 | WEIGHT: 220.5 LBS | TEMPERATURE: 98 F | DIASTOLIC BLOOD PRESSURE: 80 MMHG

## 2017-10-27 DIAGNOSIS — N63.10 BREAST MASS, RIGHT: Primary | ICD-10-CM

## 2017-10-27 PROCEDURE — 99024 POSTOP FOLLOW-UP VISIT: CPT | Performed by: SURGERY

## 2017-10-27 RX ORDER — CEPHALEXIN 500 MG/1
500 CAPSULE ORAL 2 TIMES DAILY
Qty: 14 CAPSULE | Refills: 0 | Status: SHIPPED | OUTPATIENT
Start: 2017-10-27 | End: 2017-11-03 | Stop reason: ALTCHOICE

## 2017-10-27 NOTE — PROGRESS NOTES
Wound opened up with clear serous drainage 3 days ago has been continuing to drain a small amount each day she claims some increasing discomfort over the retroareolar area of the right breast.  She denies fever chills states her abdomen is normal states he

## 2017-11-02 ENCOUNTER — TELEPHONE (OUTPATIENT)
Dept: FAMILY MEDICINE CLINIC | Facility: CLINIC | Age: 72
End: 2017-11-02

## 2017-11-02 NOTE — TELEPHONE ENCOUNTER
Spoke to Dr. Yobany Grullon and advised of information, he states he would like patient to come into office on Tuesday for follow up.     Called and spoke to patient, offered appt for Tuesday in [place], she states that she does not drive that far an the lady that he

## 2017-11-02 NOTE — TELEPHONE ENCOUNTER
KYMBERLY HAD A SMALL HOLE THAT WAS LEAKING, THAT IS HEALED, BUT THERE IS NOW ONE ABOVE IT THAT IS LEAKING. THE FLUID IS AN JOVANNY COLOR, THERE IS A LITTLE BIT OF BLOOD. SHE HAS GONE THROUGH 3 SHIRTS AND ABOUT 4 SQUARES OF GAUZE.

## 2017-11-02 NOTE — TELEPHONE ENCOUNTER
Returned phone call to patient she states that she is taking her last dose of Keflex tonight, area on the right brest has closed from Friday, now is light pink in color.  Today about 11 am she has noticed another hole under the previous one this does have a

## 2017-11-03 ENCOUNTER — OFFICE VISIT (OUTPATIENT)
Dept: FAMILY MEDICINE CLINIC | Facility: CLINIC | Age: 72
End: 2017-11-03

## 2017-11-03 VITALS
HEART RATE: 106 BPM | TEMPERATURE: 98 F | DIASTOLIC BLOOD PRESSURE: 80 MMHG | OXYGEN SATURATION: 97 % | SYSTOLIC BLOOD PRESSURE: 124 MMHG

## 2017-11-03 DIAGNOSIS — N63.10 BREAST MASS, RIGHT: Primary | ICD-10-CM

## 2017-11-03 PROCEDURE — 99024 POSTOP FOLLOW-UP VISIT: CPT | Performed by: SURGERY

## 2017-11-03 NOTE — PROGRESS NOTES
Patient continues to have serous drainage out of the lateral aspect of her right breast incision I have inspected the wounds carefully and debrided the necrotic skin this is allowed additional clear serous fluid to emanate from the wound there is no eviden

## 2017-12-22 ENCOUNTER — TELEPHONE (OUTPATIENT)
Dept: FAMILY MEDICINE CLINIC | Facility: CLINIC | Age: 72
End: 2017-12-22

## 2017-12-22 RX ORDER — GABAPENTIN 400 MG/1
400 CAPSULE ORAL 2 TIMES DAILY
Qty: 180 CAPSULE | Refills: 0 | Status: SHIPPED | OUTPATIENT
Start: 2017-12-22 | End: 2018-04-09

## 2017-12-22 RX ORDER — OMEPRAZOLE 40 MG/1
CAPSULE, DELAYED RELEASE ORAL
Qty: 180 CAPSULE | Refills: 0 | Status: SHIPPED | OUTPATIENT
Start: 2017-12-22 | End: 2018-04-09

## 2017-12-22 NOTE — TELEPHONE ENCOUNTER
90 day refill of OMEPRAZOLE 40 MG Oral Capsule Delayed  and  GABAPENTIN 400 MG Oral Cap   [facility] in [place]     She is asking to get 90 days worth to help her get through until her new prescription program starts

## 2018-01-25 ENCOUNTER — TELEPHONE (OUTPATIENT)
Dept: FAMILY MEDICINE CLINIC | Facility: CLINIC | Age: 73
End: 2018-01-25

## 2018-01-25 DIAGNOSIS — M79.646 THUMB PAIN, UNSPECIFIED LATERALITY: ICD-10-CM

## 2018-01-25 DIAGNOSIS — M25.539 PAIN IN WRIST, UNSPECIFIED LATERALITY: Primary | ICD-10-CM

## 2018-01-25 NOTE — TELEPHONE ENCOUNTER
Olya Lloyd called back and advised that he saw the patient yesterday c/o wrist and thumb arthritic changes. She is having a very hard time grasping anything and was wanting some therapy.      He was asking if we could place an order for that    I advised that I

## 2018-01-26 ENCOUNTER — TELEPHONE (OUTPATIENT)
Dept: FAMILY MEDICINE CLINIC | Facility: CLINIC | Age: 73
End: 2018-01-26

## 2018-01-26 NOTE — TELEPHONE ENCOUNTER
THEY RECIEVED FAXED ORDER IN ERROR, IT WAS FAXED  [phone]---NEEDS TO GO  [phone]---IKTY TO REFAX TO CORRECT #

## 2018-01-26 NOTE — TELEPHONE ENCOUNTER
I am not sure what happened because the cove sheet for the order has the correct fax number    refaxing

## 2018-02-02 ENCOUNTER — LAB ENCOUNTER (OUTPATIENT)
Dept: LAB | Age: 73
End: 2018-02-02
Attending: FAMILY MEDICINE
Payer: MEDICARE

## 2018-02-02 ENCOUNTER — OFFICE VISIT (OUTPATIENT)
Dept: FAMILY MEDICINE CLINIC | Facility: CLINIC | Age: 73
End: 2018-02-02

## 2018-02-02 VITALS
HEIGHT: 64 IN | SYSTOLIC BLOOD PRESSURE: 110 MMHG | TEMPERATURE: 98 F | HEART RATE: 88 BPM | WEIGHT: 225 LBS | DIASTOLIC BLOOD PRESSURE: 76 MMHG | BODY MASS INDEX: 38.41 KG/M2

## 2018-02-02 DIAGNOSIS — R53.83 OTHER FATIGUE: ICD-10-CM

## 2018-02-02 DIAGNOSIS — R53.83 OTHER FATIGUE: Primary | ICD-10-CM

## 2018-02-02 LAB
ALBUMIN SERPL-MCNC: 4 G/DL (ref 3.5–4.8)
ALP LIVER SERPL-CCNC: 93 U/L (ref 55–142)
ALT SERPL-CCNC: 27 U/L (ref 14–54)
AST SERPL-CCNC: 18 U/L (ref 15–41)
BASOPHILS # BLD AUTO: 0.06 X10(3) UL (ref 0–0.1)
BASOPHILS NFR BLD AUTO: 1.1 %
BILIRUB SERPL-MCNC: 0.3 MG/DL (ref 0.1–2)
BUN BLD-MCNC: 16 MG/DL (ref 8–20)
CALCIUM BLD-MCNC: 8.9 MG/DL (ref 8.3–10.3)
CHLORIDE: 108 MMOL/L (ref 101–111)
CHOLEST SMN-MCNC: 214 MG/DL (ref ?–200)
CO2: 25 MMOL/L (ref 22–32)
CREAT BLD-MCNC: 0.96 MG/DL (ref 0.55–1.02)
EOSINOPHIL # BLD AUTO: 0.06 X10(3) UL (ref 0–0.3)
EOSINOPHIL NFR BLD AUTO: 1.1 %
ERYTHROCYTE [DISTWIDTH] IN BLOOD BY AUTOMATED COUNT: 14.1 % (ref 11.5–16)
GLUCOSE BLD-MCNC: 101 MG/DL (ref 70–99)
HCT VFR BLD AUTO: 43 % (ref 34–50)
HDLC SERPL-MCNC: 43 MG/DL (ref 45–?)
HDLC SERPL: 4.98 {RATIO} (ref ?–4.44)
HGB BLD-MCNC: 14.4 G/DL (ref 12–16)
IMMATURE GRANULOCYTE COUNT: 0.01 X10(3) UL (ref 0–1)
IMMATURE GRANULOCYTE RATIO %: 0.2 %
LDLC SERPL CALC-MCNC: 134 MG/DL (ref ?–130)
LYMPHOCYTES # BLD AUTO: 1.91 X10(3) UL (ref 0.9–4)
LYMPHOCYTES NFR BLD AUTO: 33.8 %
M PROTEIN MFR SERPL ELPH: 7.3 G/DL (ref 6.1–8.3)
MCH RBC QN AUTO: 30.5 PG (ref 27–33.2)
MCHC RBC AUTO-ENTMCNC: 33.5 G/DL (ref 31–37)
MCV RBC AUTO: 91.1 FL (ref 81–100)
MONOCYTES # BLD AUTO: 0.42 X10(3) UL (ref 0.1–0.6)
MONOCYTES NFR BLD AUTO: 7.4 %
NEUTROPHIL ABS PRELIM: 3.19 X10 (3) UL (ref 1.3–6.7)
NEUTROPHILS # BLD AUTO: 3.19 X10(3) UL (ref 1.3–6.7)
NEUTROPHILS NFR BLD AUTO: 56.4 %
NONHDLC SERPL-MCNC: 171 MG/DL (ref ?–130)
PLATELET # BLD AUTO: 243 10(3)UL (ref 150–450)
POTASSIUM SERPL-SCNC: 4.2 MMOL/L (ref 3.6–5.1)
RBC # BLD AUTO: 4.72 X10(6)UL (ref 3.8–5.1)
RED CELL DISTRIBUTION WIDTH-SD: 47.8 FL (ref 35.1–46.3)
SODIUM SERPL-SCNC: 141 MMOL/L (ref 136–144)
TRIGL SERPL-MCNC: 187 MG/DL (ref ?–150)
TSI SER-ACNC: 1.7 MIU/ML (ref 0.35–5.5)
VLDLC SERPL CALC-MCNC: 37 MG/DL (ref 5–40)
WBC # BLD AUTO: 5.7 X10(3) UL (ref 4–13)

## 2018-02-02 PROCEDURE — 80061 LIPID PANEL: CPT

## 2018-02-02 PROCEDURE — 36415 COLL VENOUS BLD VENIPUNCTURE: CPT | Performed by: FAMILY MEDICINE

## 2018-02-02 PROCEDURE — 84443 ASSAY THYROID STIM HORMONE: CPT

## 2018-02-02 PROCEDURE — 85025 COMPLETE CBC W/AUTO DIFF WBC: CPT

## 2018-02-02 PROCEDURE — 36415 COLL VENOUS BLD VENIPUNCTURE: CPT

## 2018-02-02 PROCEDURE — 80053 COMPREHEN METABOLIC PANEL: CPT

## 2018-02-02 PROCEDURE — 71046 X-RAY EXAM CHEST 2 VIEWS: CPT | Performed by: FAMILY MEDICINE

## 2018-02-02 PROCEDURE — 99214 OFFICE O/P EST MOD 30 MIN: CPT | Performed by: FAMILY MEDICINE

## 2018-02-02 NOTE — PROGRESS NOTES
Clovis Ang is a 67year old female. Patient presents with: Other: weakness and fatigue-all pt wants to do is sleep, will fall asleep watching tv. ...started approx 2 wks ago with the weakness. ... Erika Coy room1      HPI:   Patient complains of fatigue.   She of CAD    • Cancer Paternal Grandfather      Rectal cancer        Social History:  Smoking status: Never Smoker                                                              Smokeless tobacco: Never Used                      Alcohol use: Yes           0.0 o this encounter    Imaging & Consults:  None

## 2018-02-03 NOTE — PROGRESS NOTES
Notify lipids are at goal. Recheck in  12 months. Notify chemistry profile is unremarkable. Recheck in  12 months. Notify CBC normal. No evidence of anemia or of an acute infection. Notify TSH normal. Recheck in 1 year.   Recommend appointment next week

## 2018-03-03 ENCOUNTER — MED REC SCAN ONLY (OUTPATIENT)
Dept: FAMILY MEDICINE CLINIC | Facility: CLINIC | Age: 73
End: 2018-03-03

## 2018-03-07 ENCOUNTER — HOSPITAL ENCOUNTER (OUTPATIENT)
Dept: GENERAL RADIOLOGY | Age: 73
Discharge: HOME | End: 2018-03-07
Attending: FAMILY MEDICINE

## 2018-03-07 ENCOUNTER — OFFICE VISIT (OUTPATIENT)
Dept: FAMILY MEDICINE CLINIC | Facility: CLINIC | Age: 73
End: 2018-03-07

## 2018-03-07 ENCOUNTER — HOSPITAL ENCOUNTER (OUTPATIENT)
Dept: GENERAL RADIOLOGY | Age: 73
Discharge: HOME | End: 2018-03-07
Attending: FAMILY MEDICINE
Payer: MEDICARE

## 2018-03-07 VITALS
HEART RATE: 101 BPM | DIASTOLIC BLOOD PRESSURE: 70 MMHG | BODY MASS INDEX: 39 KG/M2 | TEMPERATURE: 98 F | WEIGHT: 228.25 LBS | SYSTOLIC BLOOD PRESSURE: 130 MMHG | OXYGEN SATURATION: 94 %

## 2018-03-07 DIAGNOSIS — M25.561 ACUTE PAIN OF BOTH KNEES: ICD-10-CM

## 2018-03-07 DIAGNOSIS — M25.562 ACUTE PAIN OF BOTH KNEES: ICD-10-CM

## 2018-03-07 DIAGNOSIS — M17.0 PRIMARY OSTEOARTHRITIS OF BOTH KNEES: Primary | ICD-10-CM

## 2018-03-07 PROCEDURE — 99213 OFFICE O/P EST LOW 20 MIN: CPT | Performed by: FAMILY MEDICINE

## 2018-03-07 PROCEDURE — 73560 X-RAY EXAM OF KNEE 1 OR 2: CPT | Performed by: FAMILY MEDICINE

## 2018-03-07 NOTE — PROGRESS NOTES
Marianne Fine is a 67year old female. Patient presents with: Other: bilateral knee pain--would like xray, has been doing physical therapy. ...room 1      HPI:   Patient has had long-standing history of knee pain.   She has received Synvisc in the past Rectal cancer        Social History:  Smoking status: Never Smoker                                                              Smokeless tobacco: Never Used                      Alcohol use: Yes           0.0 oz/week     Comment: rare-wine or beer

## 2018-03-09 ENCOUNTER — OFFICE VISIT (OUTPATIENT)
Dept: FAMILY MEDICINE CLINIC | Facility: CLINIC | Age: 73
End: 2018-03-09

## 2018-03-09 VITALS
DIASTOLIC BLOOD PRESSURE: 80 MMHG | HEART RATE: 89 BPM | TEMPERATURE: 98 F | OXYGEN SATURATION: 96 % | SYSTOLIC BLOOD PRESSURE: 112 MMHG

## 2018-03-09 DIAGNOSIS — M17.0 PRIMARY OSTEOARTHRITIS OF BOTH KNEES: Primary | ICD-10-CM

## 2018-03-09 PROCEDURE — 99213 OFFICE O/P EST LOW 20 MIN: CPT | Performed by: FAMILY MEDICINE

## 2018-03-09 PROCEDURE — 20610 DRAIN/INJ JOINT/BURSA W/O US: CPT | Performed by: FAMILY MEDICINE

## 2018-03-09 NOTE — PROGRESS NOTES
Amandeep Serrano is a 67year old female. Patient presents with: Other: LT knee Synvisc injection. ...room 1      HPI:   Patient has known osteoarthritis of both knees. The left is worse than the right.   Previously she has had Synvisc injections which ha Smoker                                                              Smokeless tobacco: Never Used                      Alcohol use: Yes           0.0 oz/week     Comment: rare-wine or beer       REVIEW OF SYSTEMS:   GENERAL HEALTH: feels well otherwise  SK

## 2018-04-09 RX ORDER — OMEPRAZOLE 40 MG/1
CAPSULE, DELAYED RELEASE ORAL
Qty: 180 CAPSULE | Refills: 0 | Status: SHIPPED | OUTPATIENT
Start: 2018-04-09 | End: 2018-07-06

## 2018-04-09 RX ORDER — GABAPENTIN 400 MG/1
400 CAPSULE ORAL 2 TIMES DAILY
Qty: 180 CAPSULE | Refills: 0 | Status: SHIPPED | OUTPATIENT
Start: 2018-04-09 | End: 2018-07-06

## 2018-04-09 NOTE — TELEPHONE ENCOUNTER
gabapentin 400 MG   Omeprazole 40 MG     Call to [facility]/PHARMACY #[id number]- [address], [address] [facility] [address] [facility] [phone], [phone]

## 2018-05-01 ENCOUNTER — MED REC SCAN ONLY (OUTPATIENT)
Dept: FAMILY MEDICINE CLINIC | Facility: CLINIC | Age: 73
End: 2018-05-01

## 2018-05-02 ENCOUNTER — TELEPHONE (OUTPATIENT)
Dept: FAMILY MEDICINE CLINIC | Facility: CLINIC | Age: 73
End: 2018-05-02

## 2018-05-02 NOTE — TELEPHONE ENCOUNTER
Has a friend with knee pain-her  psrescribed Pennsaid 2%- would this work for her? Use BID on knees. It is a NSAID solution. Patient has had the Synvisc injections previously and seemed to work for her.    Calling the patient- she advised that the

## 2018-05-03 NOTE — TELEPHONE ENCOUNTER
Calling the patient back- I advised. She doesn't think that she wants to try it- she was reading the side effects. She has a friend that is huge into essential oils and had her try something and it really helped.  So she may try that for awhile

## 2018-05-03 NOTE — TELEPHONE ENCOUNTER
It may or may not help. The only way to know is to try it. May be very expensive because there is no generics. I pended the order.

## 2018-06-04 ENCOUNTER — MED REC SCAN ONLY (OUTPATIENT)
Dept: FAMILY MEDICINE CLINIC | Facility: CLINIC | Age: 73
End: 2018-06-04

## 2018-06-11 ENCOUNTER — OFFICE VISIT (OUTPATIENT)
Dept: FAMILY MEDICINE CLINIC | Facility: CLINIC | Age: 73
End: 2018-06-11

## 2018-06-11 VITALS
DIASTOLIC BLOOD PRESSURE: 74 MMHG | TEMPERATURE: 98 F | HEIGHT: 64 IN | OXYGEN SATURATION: 95 % | BODY MASS INDEX: 38.29 KG/M2 | SYSTOLIC BLOOD PRESSURE: 116 MMHG | WEIGHT: 224.25 LBS | HEART RATE: 85 BPM

## 2018-06-11 DIAGNOSIS — M48.061 SPINAL STENOSIS OF LUMBAR REGION WITHOUT NEUROGENIC CLAUDICATION: ICD-10-CM

## 2018-06-11 DIAGNOSIS — Z13.31 DEPRESSION SCREENING: ICD-10-CM

## 2018-06-11 DIAGNOSIS — Z12.11 COLON CANCER SCREENING: ICD-10-CM

## 2018-06-11 DIAGNOSIS — Z00.00 ENCOUNTER FOR ANNUAL HEALTH EXAMINATION: Primary | ICD-10-CM

## 2018-06-11 PROCEDURE — G0444 DEPRESSION SCREEN ANNUAL: HCPCS | Performed by: FAMILY MEDICINE

## 2018-06-11 PROCEDURE — G0439 PPPS, SUBSEQ VISIT: HCPCS | Performed by: FAMILY MEDICINE

## 2018-06-11 NOTE — PATIENT INSTRUCTIONS
Amy Bergman's SCREENING SCHEDULE   Tests on this list are recommended by your physician but may not be covered, or covered at this frequency, by your insurer. Please check with your insurance carrier before scheduling to verify coverage.    SHEKHAR the following criteria:   • Men who are 73-68 years old and have smoked more than 100 cigarettes in their lifetime   • Anyone with a family history    Colorectal Cancer Screening  Covered up to Age 76     Colonoscopy Screen   Covered every 10 years- more o regularly   Immunizations      Influenza  Covered Annually No orders found for this or any previous visit.  Please get every year    Pneumococcal 13 (Prevnar)  Covered Once after 65   Orders placed or performed in visit on 06/03/16  -PNEUMOCOCCAL VACC, 13 V information from the [address] [place] [address] regarding Advance Directives.

## 2018-06-11 NOTE — PROGRESS NOTES
HPI:   Bryant Forrest is a 75year old female who presents for a Medicare Subsequent Annual Wellness visit (Pt already had Initial Annual Wellness).     No complaints         Fall/Risk Assessment   She has been screened for Falls and is low risk: Fall/R Labs:     Lab Results  Component Value Date   AST 18 02/02/2018   ALT 27 02/02/2018   CA 8.9 02/02/2018   ALB 4.0 02/02/2018   TSH 1.700 02/02/2018   CREATSERUM 0.96 02/02/2018    (H) 02/02/2018        CBC  (most recent labs)     Lab Results  Compon encounter: 64\". Weight as of this encounter: 224 lb 4 oz.     Medicare Hearing Assessment  (Required for AWV/SWV)    Whispered Voice       Visual Acuity                                Vaccination History     Immunization History   Administered Date(s) A section provided for quick review of chart, separate sheet to patient  [address] Internal Lab or Procedure External Lab or Procedure   Diabetes Screening      HbgA1C   Annually No results found for: A1C No flowsheet da Annually 10/1/2017 Please get every year    Pneumococcal 13 (Prevnar)  Covered Once after 65 06/03/2016 Please get once after your 65th birthday    Pneumococcal 23 (Pneumovax)  Covered Once after 65 09/01/2013 Please get once after your 65th birthday    He

## 2018-06-15 ENCOUNTER — TELEPHONE (OUTPATIENT)
Dept: FAMILY MEDICINE CLINIC | Facility: CLINIC | Age: 73
End: 2018-06-15

## 2018-06-15 NOTE — TELEPHONE ENCOUNTER
Calling the patient because Dr is out of town     She has  Been using hydrocortisone cream since Tuesday  Previously she was using the benadryl.      Maybe she will try both over the weekend  If she still having problems on Monday she will call back

## 2018-06-15 NOTE — TELEPHONE ENCOUNTER
HAS POISON IVY, WANTS PREDNISONE OR SOMETHING CALLED TO [facility] IN [place], SHE HAS BEEN USING HYDROCORTISONE CREAM & IT'S NOT WORKING

## 2018-06-18 ENCOUNTER — TELEPHONE (OUTPATIENT)
Dept: FAMILY MEDICINE CLINIC | Facility: CLINIC | Age: 73
End: 2018-06-18

## 2018-06-18 RX ORDER — PREDNISONE 20 MG/1
20 TABLET ORAL 2 TIMES DAILY
Qty: 14 TABLET | Refills: 0 | Status: SHIPPED | OUTPATIENT
Start: 2018-06-18 | End: 2018-06-25

## 2018-06-18 NOTE — TELEPHONE ENCOUNTER
Patient has been using Benadryl and hydrocortisone cream for about a week for her poison ivy and nothing is helping. She really didn't want to have to come in.   She is asking if Dr Rodrigo Hutchins will send something in?

## 2018-06-18 NOTE — TELEPHONE ENCOUNTER
POISON HANS IS NOT ANY BETTER, PLEASE SEND SOMETHING AS DISCUSSED Friday TO [facility] IN [facility]

## 2018-07-06 RX ORDER — OMEPRAZOLE 40 MG/1
CAPSULE, DELAYED RELEASE ORAL
Qty: 180 CAPSULE | Refills: 0 | Status: SHIPPED | OUTPATIENT
Start: 2018-07-06 | End: 2019-04-02

## 2018-07-06 RX ORDER — GABAPENTIN 400 MG/1
400 CAPSULE ORAL 2 TIMES DAILY
Qty: 180 CAPSULE | Refills: 0 | Status: SHIPPED | OUTPATIENT
Start: 2018-07-06 | End: 2018-10-19

## 2018-10-19 RX ORDER — GABAPENTIN 400 MG/1
400 CAPSULE ORAL 2 TIMES DAILY
Qty: 180 CAPSULE | Refills: 0 | Status: SHIPPED | OUTPATIENT
Start: 2018-10-19 | End: 2018-12-31

## 2018-10-30 ENCOUNTER — TELEPHONE (OUTPATIENT)
Dept: FAMILY MEDICINE CLINIC | Facility: CLINIC | Age: 73
End: 2018-10-30

## 2018-10-30 NOTE — TELEPHONE ENCOUNTER
I called the patient and advised that  is out of the office   She was asking if I could ask one of the other doctors     She wants to use the CBD oil to put on her joints to help with her arthritis.  She just wants to make sure that it will not interfere

## 2018-10-30 NOTE — TELEPHONE ENCOUNTER
Pt is interested in trying marijuana oil for her joints. Would it interfere with the med's she takes? Where would she buy it?

## 2018-11-09 ENCOUNTER — TELEPHONE (OUTPATIENT)
Dept: FAMILY MEDICINE CLINIC | Facility: CLINIC | Age: 73
End: 2018-11-09

## 2018-11-09 DIAGNOSIS — R82.90 ABNORMAL URINE ODOR: Primary | ICD-10-CM

## 2018-11-09 DIAGNOSIS — R30.0 DYSURIA: ICD-10-CM

## 2018-11-09 PROCEDURE — 81003 URINALYSIS AUTO W/O SCOPE: CPT | Performed by: FAMILY MEDICINE

## 2018-11-09 NOTE — TELEPHONE ENCOUNTER
Calling the patient-   Her urine smells strong, pubic pain.    She is pushing water, taking cranberry pills     She will drop a urine off tomorrow morning about 10am- she has a sterile cup from us at home

## 2018-11-10 ENCOUNTER — NURSE ONLY (OUTPATIENT)
Dept: FAMILY MEDICINE CLINIC | Facility: CLINIC | Age: 73
End: 2018-11-10

## 2018-11-10 DIAGNOSIS — R30.0 DYSURIA: ICD-10-CM

## 2018-11-10 DIAGNOSIS — R35.0 URINARY FREQUENCY: Primary | ICD-10-CM

## 2018-11-10 PROCEDURE — 87086 URINE CULTURE/COLONY COUNT: CPT | Performed by: INTERNAL MEDICINE

## 2018-11-23 ENCOUNTER — TELEPHONE (OUTPATIENT)
Dept: FAMILY MEDICINE CLINIC | Facility: CLINIC | Age: 73
End: 2018-11-23

## 2018-11-23 NOTE — TELEPHONE ENCOUNTER
Calling the patient- left shoulder pain and now it radiates into the left arm. She had been taking Ibuprofen  She has been using the heating pad  Using Bengay      I recommended that she not use the heat.    Alternate the Ibuprofen and the Tylenol  Using

## 2018-11-27 ENCOUNTER — OFFICE VISIT (OUTPATIENT)
Dept: FAMILY MEDICINE CLINIC | Facility: CLINIC | Age: 73
End: 2018-11-27
Payer: MEDICARE

## 2018-11-27 VITALS
WEIGHT: 224.38 LBS | HEIGHT: 64 IN | SYSTOLIC BLOOD PRESSURE: 130 MMHG | HEART RATE: 80 BPM | TEMPERATURE: 97 F | BODY MASS INDEX: 38.31 KG/M2 | RESPIRATION RATE: 12 BRPM | DIASTOLIC BLOOD PRESSURE: 82 MMHG

## 2018-11-27 DIAGNOSIS — M54.12 CERVICAL RADICULITIS: Primary | ICD-10-CM

## 2018-11-27 DIAGNOSIS — M25.512 ACUTE PAIN OF LEFT SHOULDER: ICD-10-CM

## 2018-11-27 PROCEDURE — 99214 OFFICE O/P EST MOD 30 MIN: CPT | Performed by: FAMILY MEDICINE

## 2018-11-27 RX ORDER — PREDNISONE 20 MG/1
20 TABLET ORAL 2 TIMES DAILY
Qty: 14 TABLET | Refills: 0 | Status: SHIPPED | OUTPATIENT
Start: 2018-11-27 | End: 2018-12-04
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
## 2018-12-31 ENCOUNTER — TELEPHONE (OUTPATIENT)
Dept: FAMILY MEDICINE CLINIC | Facility: CLINIC | Age: 73
End: 2018-12-31

## 2018-12-31 RX ORDER — GABAPENTIN 400 MG/1
400 CAPSULE ORAL 2 TIMES DAILY
Qty: 180 CAPSULE | Refills: 0 | Status: SHIPPED | OUTPATIENT
Start: 2018-12-31 | End: 2019-03-25

## 2019-01-21 ENCOUNTER — TELEPHONE (OUTPATIENT)
Dept: FAMILY MEDICINE CLINIC | Facility: CLINIC | Age: 74
End: 2019-01-21

## 2019-01-21 NOTE — TELEPHONE ENCOUNTER
Pt. Would like to get a pap done this year. She called Medicare and they said they will cover. Pt. Scheduled her wellness for June. She thought Dr. Monty Olivares said he would not do it with her wellness? Pt. Also would like to get a mammo. Pt.  Also has some questions

## 2019-01-21 NOTE — TELEPHONE ENCOUNTER
Calling the patient-    Future Appointments   Date Time Provider Janie Moulton   6/17/2019 10:00 AM Nahed Brito, DO [facility] [place]     I explained that he can not do a pap smear during the Wellness.    She called Medicare today and they expla

## 2019-01-21 NOTE — TELEPHONE ENCOUNTER
She is asking if she couldn't just do the cologuard?  Her previous colonoscopy in 2013 was normal.   I will address with Dr Ady Odonnell and let her know if that is an option and call her back       Please advise

## 2019-01-21 NOTE — TELEPHONE ENCOUNTER
Calling the patient back to advise-     She will address at the pap smear visit otherwise she will call if she will be in the area sooner

## 2019-02-11 ENCOUNTER — TELEPHONE (OUTPATIENT)
Dept: FAMILY MEDICINE CLINIC | Facility: CLINIC | Age: 74
End: 2019-02-11

## 2019-02-11 NOTE — TELEPHONE ENCOUNTER
Calling the patient-     Crease of her leg/abdomen pain. It isnot constant. It almost feels like she needs to have a bowl movement. Feels like a pulled muscle. She has had this for about a week intermittently. She has gas and some constipation problems.

## 2019-02-11 NOTE — TELEPHONE ENCOUNTER
KYMBERLY'S NEIGHBOR BROUGHT HER MIRALAX AND MILK OF MAGNESIA. KYMBERLY WANTED TO SPEAK WITH SHAYNA TO SEE IF SHE SHOULD GO AHEAD AND START TAKING THIS FOR HER POSSIBLE CONSTIPATION.  PLEASE CALL

## 2019-02-11 NOTE — TELEPHONE ENCOUNTER
Calling to advise that is it ok for her to start the Miralax and the MOM. I encouraged the patient to push the water- this is very important.

## 2019-02-11 NOTE — TELEPHONE ENCOUNTER
KYMBERLY DOESN'T KNOW IF SHE HAS A PULLED MUSCLE OR SOMETHING GOING ON WITH HER APPENDEX? SHE STARTED TO FEEL QUEEZY YESTERDAY. NO KNOWN FEVER. SHE IS EATING OKAY.

## 2019-02-13 ENCOUNTER — NURSE ONLY (OUTPATIENT)
Dept: FAMILY MEDICINE CLINIC | Facility: CLINIC | Age: 74
End: 2019-02-13
Payer: MEDICARE

## 2019-02-13 ENCOUNTER — TELEPHONE (OUTPATIENT)
Dept: FAMILY MEDICINE CLINIC | Facility: CLINIC | Age: 74
End: 2019-02-13

## 2019-02-13 DIAGNOSIS — R10.2 PELVIC PAIN: Primary | ICD-10-CM

## 2019-02-13 DIAGNOSIS — R11.0 NAUSEA: Primary | ICD-10-CM

## 2019-02-13 LAB
APPEARANCE: CLEAR
MULTISTIX LOT#: NORMAL NUMERIC
PH, URINE: 7 (ref 4.5–8)
SPECIFIC GRAVITY: 1.01 (ref 1–1.03)
URINE-COLOR: YELLOW
UROBILINOGEN,SEMI-QN: 0.2 MG/DL (ref 0–1.9)

## 2019-02-13 PROCEDURE — 87086 URINE CULTURE/COLONY COUNT: CPT | Performed by: FAMILY MEDICINE

## 2019-02-13 PROCEDURE — 81003 URINALYSIS AUTO W/O SCOPE: CPT | Performed by: FAMILY MEDICINE

## 2019-02-13 RX ORDER — CIPROFLOXACIN 250 MG/1
250 TABLET, FILM COATED ORAL 2 TIMES DAILY
Qty: 10 TABLET | Refills: 0 | Status: SHIPPED | OUTPATIENT
Start: 2019-02-13 | End: 2019-02-18

## 2019-02-13 NOTE — TELEPHONE ENCOUNTER
I had talked with the patient on 2/11/19 and all her symptoms sounded like constipation. I had sent the call to Dr Jenaro Rose but she did not respond and the patient at that time had called back and was going to try MOM, Miralax and push the fluids.      She i

## 2019-02-13 NOTE — TELEPHONE ENCOUNTER
PAIN IN RIGHT SIDE, DOWN IN GROIN AREA. PULLED MUSCLE OR APPENDICITIS? IT'S NOT CONSTIPATION. ITS CAUSING NAUSEA. CAN SHE BE SEEN TODAY.   PLEASE CALL

## 2019-02-13 NOTE — TELEPHONE ENCOUNTER
Calling Amy back to get some more information. I called the number back that was given in the phone note and it rang then went blank. Pt is having nausea, feels like she has to urinate all the time, pain in groin area-pt was constipated yesterday.

## 2019-02-13 NOTE — TELEPHONE ENCOUNTER
Patient is concerned about the Cipro medication. The disclosure information states that the medication can rupture or cause swelling of the tendons.   Patient has neuropathy of her feet and already has tendon swelling of her right wrist.    Is it okay to ta

## 2019-02-14 ENCOUNTER — HOSPITAL ENCOUNTER (OUTPATIENT)
Dept: CT IMAGING | Age: 74
Discharge: HOME | End: 2019-02-14
Attending: FAMILY MEDICINE
Payer: MEDICARE

## 2019-02-14 ENCOUNTER — TELEPHONE (OUTPATIENT)
Dept: FAMILY MEDICINE CLINIC | Facility: CLINIC | Age: 74
End: 2019-02-14

## 2019-02-14 ENCOUNTER — OFFICE VISIT (OUTPATIENT)
Dept: FAMILY MEDICINE CLINIC | Facility: CLINIC | Age: 74
End: 2019-02-14
Payer: MEDICARE

## 2019-02-14 VITALS
OXYGEN SATURATION: 100 % | TEMPERATURE: 97 F | RESPIRATION RATE: 12 BRPM | HEART RATE: 96 BPM | SYSTOLIC BLOOD PRESSURE: 128 MMHG | DIASTOLIC BLOOD PRESSURE: 80 MMHG | BODY MASS INDEX: 38.24 KG/M2 | WEIGHT: 224 LBS | HEIGHT: 64 IN

## 2019-02-14 DIAGNOSIS — R10.31 RIGHT LOWER QUADRANT ABDOMINAL PAIN: Primary | ICD-10-CM

## 2019-02-14 DIAGNOSIS — R10.31 PAIN, ABDOMINAL, RLQ: ICD-10-CM

## 2019-02-14 DIAGNOSIS — R10.31 PAIN, ABDOMINAL, RLQ: Primary | ICD-10-CM

## 2019-02-14 LAB — CREAT SERPL-MCNC: 0.8 MG/DL (ref 0.55–1.02)

## 2019-02-14 PROCEDURE — 74177 CT ABD & PELVIS W/CONTRAST: CPT | Performed by: FAMILY MEDICINE

## 2019-02-14 PROCEDURE — 82565 ASSAY OF CREATININE: CPT

## 2019-02-14 PROCEDURE — 99214 OFFICE O/P EST MOD 30 MIN: CPT | Performed by: FAMILY MEDICINE

## 2019-02-14 NOTE — TELEPHONE ENCOUNTER
Lynda 68 ON 3/5/19. DR LI'S OFFICE NEEDS WRITTEN REFERRAL AND LAST COLONOCOPY REPORT. SENT PRIOR TO CONSULT.

## 2019-02-14 NOTE — TELEPHONE ENCOUNTER
Pended referral to Dr. Mitchel Amor, unsure of diagnosis to associate. Routed to Dr. Nichole Gilmore.      Printed her colonoscopy report dated: 7/22/2013 - put in follow up folder to fax with the referral.

## 2019-02-14 NOTE — PROGRESS NOTES
Scheduled patient at 2pm in [facility] for CT scan. Contacted patient. Informed she has to arrive at 12:15 to start drinking the prep, NPO 2 hours prior to exam. Verbalizes understanding.

## 2019-02-14 NOTE — PROGRESS NOTES
Wade Araya is a 75year old female. Patient presents with: Other: RLQ  pain inrm. 1       HPI:   Pain to right inguinal area into right hip.no vomiting or diarrhea but has had nausea. No dysuria, fever, hematuria. Has had constipation.  No melena o of CAD    • Cancer Paternal Grandfather         Rectal cancer        Social History:  Social History    Tobacco Use      Smoking status: Never Smoker      Smokeless tobacco: Never Used    Alcohol use: No      Alcohol/week: 0.0 oz      Frequency: Never

## 2019-03-05 ENCOUNTER — MED REC SCAN ONLY (OUTPATIENT)
Dept: FAMILY MEDICINE CLINIC | Facility: CLINIC | Age: 74
End: 2019-03-05

## 2019-03-14 ENCOUNTER — TELEPHONE (OUTPATIENT)
Dept: FAMILY MEDICINE CLINIC | Facility: CLINIC | Age: 74
End: 2019-03-14

## 2019-03-14 NOTE — TELEPHONE ENCOUNTER
Called pt's home phone. Discussed phone message. States for the last 3 weeks she had had intermittent chest pressure and that is why the EGD was scheduled because she thought her hiatal hernia acting up again.  Had EGD on Monday, no significant findings

## 2019-03-14 NOTE — TELEPHONE ENCOUNTER
Pt. Called Qadirs office re: chest pressure. They informed her to call her PCP. Per Dr. Ashwini Henderson no EGD showed no cause and nissen was intact. FYI. Pt. Should be calling today if not we should f/u with pt. In next 20 min. Per Dr. Margret Rosa office.

## 2019-03-14 NOTE — TELEPHONE ENCOUNTER
Pt spoke with Chauncey Iglesias and scheduled office visit for tomorrow for chest pain that radiated to throat but has resolved s/p EGD Monday. Called patient at [phone], no answer, left message for patient to return phone call.

## 2019-03-14 NOTE — TELEPHONE ENCOUNTER
Discussed with Dr. Kyree Mulatni, instructed patient should try Mylanta or Maalox. If she develops worsening symptoms report to the ER otherwise see Dr. Rodrigo Hutchins in the office tomorrow. Contacted patient, notified of Dr. Anshul Styles response.  Verbalizes [employer]

## 2019-03-15 ENCOUNTER — OFFICE VISIT (OUTPATIENT)
Dept: FAMILY MEDICINE CLINIC | Facility: CLINIC | Age: 74
End: 2019-03-15
Payer: MEDICARE

## 2019-03-15 ENCOUNTER — TELEPHONE (OUTPATIENT)
Dept: FAMILY MEDICINE CLINIC | Facility: CLINIC | Age: 74
End: 2019-03-15

## 2019-03-15 VITALS
WEIGHT: 222.81 LBS | SYSTOLIC BLOOD PRESSURE: 116 MMHG | RESPIRATION RATE: 16 BRPM | BODY MASS INDEX: 38.04 KG/M2 | HEART RATE: 80 BPM | TEMPERATURE: 98 F | HEIGHT: 64 IN | DIASTOLIC BLOOD PRESSURE: 78 MMHG

## 2019-03-15 DIAGNOSIS — I20.8 OTHER FORMS OF ANGINA PECTORIS (HCC): Primary | ICD-10-CM

## 2019-03-15 PROCEDURE — 99214 OFFICE O/P EST MOD 30 MIN: CPT | Performed by: FAMILY MEDICINE

## 2019-03-15 NOTE — TELEPHONE ENCOUNTER
Patient needs a [facility] in [place]- if possible  Waiting for approval and will schedule at that time

## 2019-03-15 NOTE — PROGRESS NOTES
Veronia Gowers is a 75year old female. Patient presents with:  Chest Pain: Pt. is c/o pain in her chest and feels that it is her reflux. Pt. is also c/o SOB.  Pt. states she took Mylanta and that seemed to help but did not take med today      HPI:   EGD age 60   • Heart Disease Mother         Mother  at age 80 of CAD    • Cancer Paternal Grandfather         Rectal cancer        Social History:  Social History    Tobacco Use      Smoking status: Never Smoker      Smokeless tobacco: Never Used    Alcoho

## 2019-03-18 ENCOUNTER — TELEPHONE (OUTPATIENT)
Dept: FAMILY MEDICINE CLINIC | Facility: CLINIC | Age: 74
End: 2019-03-18

## 2019-03-18 NOTE — TELEPHONE ENCOUNTER
I called the patient and explained that we are still waiting for approval.     She advised that she woke up last night with a lot of chest pain and today she is very tired.    I explained that if that happens again- or if she develops more symptoms, etc. Sh

## 2019-03-18 NOTE — TELEPHONE ENCOUNTER
I explained we are waiting to hear back from the insurance on the stress test but she wanted to let us know she almost went to the ER last night because the pain was so bad.  KEYANA

## 2019-03-18 NOTE — TELEPHONE ENCOUNTER
----- Message from [employer] sent at 3/18/2019  4:25 PM CDT -----  Contact: PT  CALL PT, CHECKING STATUS ON LEXISCAN, IF IT HAS BEEN APPROVED YET?

## 2019-03-19 ENCOUNTER — TELEPHONE (OUTPATIENT)
Dept: FAMILY MEDICINE CLINIC | Facility: CLINIC | Age: 74
End: 2019-03-19

## 2019-03-19 NOTE — TELEPHONE ENCOUNTER
The stress test has been approved so I will call to schedule     The patient does not want to have to go to [place]. I will see if we can schedule in [place]. I got through to scheduling and they advised that the patient is already scheduled.    Fu

## 2019-03-20 ENCOUNTER — HOSPITAL ENCOUNTER (OUTPATIENT)
Dept: CV DIAGNOSTICS | Age: 74
Discharge: HOME | End: 2019-03-20
Attending: FAMILY MEDICINE
Payer: MEDICARE

## 2019-03-20 DIAGNOSIS — I20.8 OTHER FORMS OF ANGINA PECTORIS (HCC): ICD-10-CM

## 2019-03-20 PROCEDURE — 78452 HT MUSCLE IMAGE SPECT MULT: CPT | Performed by: FAMILY MEDICINE

## 2019-03-20 PROCEDURE — 93017 CV STRESS TEST TRACING ONLY: CPT | Performed by: FAMILY MEDICINE

## 2019-03-20 PROCEDURE — 93018 CV STRESS TEST I&R ONLY: CPT | Performed by: FAMILY MEDICINE

## 2019-03-25 RX ORDER — GABAPENTIN 400 MG/1
400 CAPSULE ORAL 2 TIMES DAILY
Qty: 180 CAPSULE | Refills: 0 | Status: SHIPPED | OUTPATIENT
Start: 2019-03-25 | End: 2019-06-18

## 2019-04-02 RX ORDER — OMEPRAZOLE 40 MG/1
CAPSULE, DELAYED RELEASE ORAL
Qty: 180 CAPSULE | Refills: 0 | Status: SHIPPED | OUTPATIENT
Start: 2019-04-02 | End: 2019-06-17

## 2019-05-08 ENCOUNTER — TELEPHONE (OUTPATIENT)
Dept: FAMILY MEDICINE CLINIC | Facility: CLINIC | Age: 74
End: 2019-05-08

## 2019-05-08 ENCOUNTER — OFFICE VISIT (OUTPATIENT)
Dept: FAMILY MEDICINE CLINIC | Facility: CLINIC | Age: 74
End: 2019-05-08
Payer: MEDICARE

## 2019-05-08 ENCOUNTER — LAB ENCOUNTER (OUTPATIENT)
Dept: LAB | Age: 74
End: 2019-05-08
Attending: FAMILY MEDICINE
Payer: MEDICARE

## 2019-05-08 VITALS
DIASTOLIC BLOOD PRESSURE: 76 MMHG | SYSTOLIC BLOOD PRESSURE: 120 MMHG | TEMPERATURE: 99 F | WEIGHT: 226 LBS | BODY MASS INDEX: 39 KG/M2

## 2019-05-08 DIAGNOSIS — H61.22 IMPACTED CERUMEN OF LEFT EAR: ICD-10-CM

## 2019-05-08 DIAGNOSIS — R53.82 CHRONIC FATIGUE: ICD-10-CM

## 2019-05-08 DIAGNOSIS — B37.2 CANDIDAL SKIN INFECTION: ICD-10-CM

## 2019-05-08 DIAGNOSIS — L24.7 IRRITANT CONTACT DERMATITIS DUE TO PLANTS, EXCEPT FOOD: Primary | ICD-10-CM

## 2019-05-08 DIAGNOSIS — K59.02 DYSSYNERGIC CONSTIPATION: ICD-10-CM

## 2019-05-08 PROCEDURE — 85025 COMPLETE CBC W/AUTO DIFF WBC: CPT

## 2019-05-08 PROCEDURE — 84443 ASSAY THYROID STIM HORMONE: CPT

## 2019-05-08 PROCEDURE — 36415 COLL VENOUS BLD VENIPUNCTURE: CPT

## 2019-05-08 PROCEDURE — 69210 REMOVE IMPACTED EAR WAX UNI: CPT | Performed by: NURSE PRACTITIONER

## 2019-05-08 PROCEDURE — 99214 OFFICE O/P EST MOD 30 MIN: CPT | Performed by: NURSE PRACTITIONER

## 2019-05-08 PROCEDURE — 80061 LIPID PANEL: CPT

## 2019-05-08 PROCEDURE — 80053 COMPREHEN METABOLIC PANEL: CPT

## 2019-05-08 RX ORDER — METHYLPREDNISOLONE 4 MG/1
TABLET ORAL
Qty: 1 KIT | Refills: 0 | Status: SHIPPED | OUTPATIENT
Start: 2019-05-08 | End: 2019-05-20 | Stop reason: ALTCHOICE

## 2019-05-08 NOTE — TELEPHONE ENCOUNTER
I called the patient and advised    Future Appointments   Date Time Provider Janie Moulton   5/8/2019 12:30 PM MAXINE Perez [facility] [place]   6/17/2019 10:00 AM Dinesh Carvajal DO [facility] [place]

## 2019-05-08 NOTE — PATIENT INSTRUCTIONS
May use topical hydrocortisone on the forearm, in addition to oral steroid  Can use Bendaryl oral for continued to itching as needed

## 2019-05-08 NOTE — TELEPHONE ENCOUNTER
KYMBERLY WAS WORKING OUT IN THE [place] YESTERDAY AND BELIEVES SHE HAS SOME TYPE OF POISON IVY. IT'S ON HER RIGHT EYE LID AND LEFT ARM. SHE WOKE UP THIS MORNING AND IT WAS ALL PUFFED UP. SHE IS WONDERING IF DR [facility] CAN PRESCRIBE SOMETHING.  PLEASE CALL    MARIA A

## 2019-05-08 NOTE — PROGRESS NOTES
HPI:   Rash   This is a new problem. The current episode started yesterday. Progression since onset: it is spreading. Location: started on her left forearm, woke up this morning and she now has it around her right eye.  The rash is characterized by itchin Alum & Mag Hydroxide-Simeth (MYLANTA OR) Take by mouth.  Disp:  Rfl:    OMEPRAZOLE 40 MG Oral Capsule Delayed Release TAKE 1 CAPSULE(40 MG) BY MOUTH TWICE DAILY Disp: 180 capsule Rfl: 0   Naproxen Sodium (ALEVE) 220 MG Oral Cap Take  by mouth 2 (two) times Cardiovascular: Negative for chest pain and palpitations. Gastrointestinal: Positive for heartburn and constipation. Negative for nausea, vomiting, abdominal pain and hemorrhoids. Diarrhea: some days.    Genitourinary: Negative for bladder incontinence an -     LIPID PANEL;  Future

## 2019-05-11 ENCOUNTER — TELEPHONE (OUTPATIENT)
Dept: FAMILY MEDICINE CLINIC | Facility: CLINIC | Age: 74
End: 2019-05-11

## 2019-05-11 RX ORDER — PREDNISONE 20 MG/1
20 TABLET ORAL 2 TIMES DAILY
Qty: 14 TABLET | Refills: 0 | Status: SHIPPED | OUTPATIENT
Start: 2019-05-11 | End: 2019-05-20

## 2019-05-11 NOTE — TELEPHONE ENCOUNTER
Calling the patient-     She has been taking the methylprednisolone, benadryl topical     It is on her tummy, both arms, down towards her hip.  Her eye is improving a lot  Red,tiny blisters, no oozing,    I advised that I will give Dariana the update and see

## 2019-05-11 NOTE — TELEPHONE ENCOUNTER
I talked with Dr. Jay Gurrola. Will send in Prednisone for her but it continues to worsen, she should come back in. I sent the Rx to [facility] in [place].

## 2019-05-13 ENCOUNTER — TELEPHONE (OUTPATIENT)
Dept: FAMILY MEDICINE CLINIC | Facility: CLINIC | Age: 74
End: 2019-05-13

## 2019-05-13 NOTE — TELEPHONE ENCOUNTER
I talked with Amy to see how the Prednisone was working. She said she is feeling better. Possibly a couple of new spots but nothing terrible. Will let us know for worsening issues.

## 2019-05-20 ENCOUNTER — OFFICE VISIT (OUTPATIENT)
Dept: FAMILY MEDICINE CLINIC | Facility: CLINIC | Age: 74
End: 2019-05-20
Payer: MEDICARE

## 2019-05-20 ENCOUNTER — TELEPHONE (OUTPATIENT)
Dept: FAMILY MEDICINE CLINIC | Facility: CLINIC | Age: 74
End: 2019-05-20

## 2019-05-20 VITALS
BODY MASS INDEX: 39 KG/M2 | SYSTOLIC BLOOD PRESSURE: 130 MMHG | HEART RATE: 92 BPM | WEIGHT: 225.19 LBS | TEMPERATURE: 97 F | RESPIRATION RATE: 16 BRPM | OXYGEN SATURATION: 97 % | DIASTOLIC BLOOD PRESSURE: 78 MMHG

## 2019-05-20 DIAGNOSIS — B37.2 CUTANEOUS CANDIDIASIS: ICD-10-CM

## 2019-05-20 DIAGNOSIS — L24.7 IRRITANT CONTACT DERMATITIS DUE TO PLANTS, EXCEPT FOOD: Primary | ICD-10-CM

## 2019-05-20 PROCEDURE — 99213 OFFICE O/P EST LOW 20 MIN: CPT | Performed by: NURSE PRACTITIONER

## 2019-05-20 RX ORDER — FLUCONAZOLE 100 MG/1
100 TABLET ORAL DAILY
Qty: 7 TABLET | Refills: 0 | Status: SHIPPED | OUTPATIENT
Start: 2019-05-20 | End: 2019-05-27

## 2019-05-20 RX ORDER — PREDNISONE 20 MG/1
20 TABLET ORAL 2 TIMES DAILY
Qty: 14 TABLET | Refills: 0 | Status: SHIPPED | OUTPATIENT
Start: 2019-05-20 | End: 2019-05-20 | Stop reason: ALTCHOICE

## 2019-05-20 RX ORDER — PREDNISONE 20 MG/1
20 TABLET ORAL 2 TIMES DAILY
Qty: 14 TABLET | Refills: 0 | COMMUNITY
Start: 2019-05-20 | End: 2019-05-27

## 2019-05-20 NOTE — PROGRESS NOTES
HPI:   Itching   This is a recurrent problem. Episode onset: last visit 5/8/2019. The problem occurs constantly. The problem has been waxing and waning (had gotten much better by this past Friday, started getting bad again last night).  Associated symptom • OTHER SURGICAL HISTORY      R breast stereotactic bx   • SPINE SURGERY PROCEDURE UNLISTED     • TONSILLECTOMY     • UPPER GI ENDOSCOPY,EXAM  2/16/2011    Mk Gamez, GERD      Family History   Problem Relation Age of Onset   • Cancer Father         father die -     fluconazole 100 MG Oral Tab; Take 1 tablet (100 mg total) by mouth daily for 7 days. Discussed and examined with Dr. Miladis Romero.

## 2019-05-20 NOTE — TELEPHONE ENCOUNTER
I talked with Amy. She had gotten better but started itching again really bad last night. Will have her come in.

## 2019-05-20 NOTE — TELEPHONE ENCOUNTER
predniSONE 20 MG Oral Tab   PLEASE SEND REFILL TO [facility] IN [place]  SHE SAID SHE IS OUT OF MEDS AND SHE IS STILL REALLY ITCHY DUE TO THE POISON OAK

## 2019-05-20 NOTE — TELEPHONE ENCOUNTER
PREDNISONE IS HELPING, BUT CAUSING KYMBERLY TO BE JITTERY. SHE TOOK ALL DOSES, ENDED Friday. WOKE UP ITCHING SAME SPOTS. NOT AS BAD, BUT IT'S STILL THERE. CAN  [address] OR A SHOT? PERHAPS A SHOT OF YE SAYS VERONICA!   PLEASE CALL

## 2019-05-24 ENCOUNTER — MED REC SCAN ONLY (OUTPATIENT)
Dept: FAMILY MEDICINE CLINIC | Facility: CLINIC | Age: 74
End: 2019-05-24

## 2019-06-06 ENCOUNTER — TELEPHONE (OUTPATIENT)
Dept: FAMILY MEDICINE CLINIC | Facility: CLINIC | Age: 74
End: 2019-06-06

## 2019-06-06 NOTE — TELEPHONE ENCOUNTER
Could not find the information in the chart, may have looked in the wrong spot. Contacted patient given the following information:    [facility]  [address], [place] [address]

## 2019-06-10 ENCOUNTER — TELEPHONE (OUTPATIENT)
Dept: FAMILY MEDICINE CLINIC | Facility: CLINIC | Age: 74
End: 2019-06-10

## 2019-06-10 ENCOUNTER — OFFICE VISIT (OUTPATIENT)
Dept: FAMILY MEDICINE CLINIC | Facility: CLINIC | Age: 74
End: 2019-06-10
Payer: MEDICARE

## 2019-06-10 VITALS
SYSTOLIC BLOOD PRESSURE: 124 MMHG | BODY MASS INDEX: 38.93 KG/M2 | DIASTOLIC BLOOD PRESSURE: 64 MMHG | HEIGHT: 64 IN | HEART RATE: 78 BPM | WEIGHT: 228 LBS | TEMPERATURE: 98 F | OXYGEN SATURATION: 98 %

## 2019-06-10 DIAGNOSIS — J01.10 ACUTE NON-RECURRENT FRONTAL SINUSITIS: Primary | ICD-10-CM

## 2019-06-10 PROCEDURE — 99213 OFFICE O/P EST LOW 20 MIN: CPT | Performed by: NURSE PRACTITIONER

## 2019-06-10 RX ORDER — AMOXICILLIN AND CLAVULANATE POTASSIUM 875; 125 MG/1; MG/1
1 TABLET, FILM COATED ORAL 2 TIMES DAILY
Qty: 20 TABLET | Refills: 0 | Status: SHIPPED | OUTPATIENT
Start: 2019-06-10 | End: 2019-06-20

## 2019-06-10 NOTE — PROGRESS NOTES
HPI:   Dizziness   This is a new problem. The current episode started today (she was outside walking her dog and told her neighbor that she felt dizzy. They called paramedics, vitals were stable and they said her exam was normal).  The problem has been gr SURGERY PROCEDURE UNLISTED     • TONSILLECTOMY     • UPPER GI ENDOSCOPY,EXAM  2011    Kayla Hernandez, GERD      Family History   Problem Relation Age of Onset   • Cancer Father         father  of colon cancer at age 61   • Heart Disease Mother         Moth wheezes. Neurological: She is alert and oriented to person, place, and time. ASSESSMENT AND PLAN:   Diagnoses and all orders for this visit:    Acute non-recurrent frontal sinusitis  -     Amoxicillin-Pot Clavulanate 875-125 MG Oral Tab;  Take 1

## 2019-06-10 NOTE — PATIENT INSTRUCTIONS
Flonase or Nasonex over the counter, 2 sprays each side once per day for a week, then 1 spray each side daily

## 2019-06-10 NOTE — TELEPHONE ENCOUNTER
The patient called in. Woke up 8am - head felt like it was \"swimming\". She felt \"drunk\". Took her dog out and the neighbor came over. She called the ambulance.  They were with her for about 20 minutes and they did let her know that she is NOT hav

## 2019-06-17 ENCOUNTER — OFFICE VISIT (OUTPATIENT)
Dept: FAMILY MEDICINE CLINIC | Facility: CLINIC | Age: 74
End: 2019-06-17
Payer: MEDICARE

## 2019-06-17 VITALS
HEART RATE: 93 BPM | TEMPERATURE: 98 F | SYSTOLIC BLOOD PRESSURE: 120 MMHG | OXYGEN SATURATION: 94 % | HEIGHT: 63 IN | BODY MASS INDEX: 40.31 KG/M2 | DIASTOLIC BLOOD PRESSURE: 70 MMHG | WEIGHT: 227.5 LBS

## 2019-06-17 DIAGNOSIS — Z00.00 ENCOUNTER FOR ANNUAL HEALTH EXAMINATION: ICD-10-CM

## 2019-06-17 DIAGNOSIS — Z13.31 DEPRESSION SCREENING: ICD-10-CM

## 2019-06-17 DIAGNOSIS — M48.061 SPINAL STENOSIS OF LUMBAR REGION WITHOUT NEUROGENIC CLAUDICATION: Primary | ICD-10-CM

## 2019-06-17 DIAGNOSIS — Z12.39 BREAST CANCER SCREENING: ICD-10-CM

## 2019-06-17 PROCEDURE — G0444 DEPRESSION SCREEN ANNUAL: HCPCS | Performed by: FAMILY MEDICINE

## 2019-06-17 PROCEDURE — G0439 PPPS, SUBSEQ VISIT: HCPCS | Performed by: FAMILY MEDICINE

## 2019-06-17 RX ORDER — RANITIDINE 150 MG/1
150 TABLET ORAL 2 TIMES DAILY
Qty: 180 TABLET | Refills: 0 | COMMUNITY
Start: 2019-06-17 | End: 2019-06-21

## 2019-06-17 RX ORDER — FLUTICASONE PROPIONATE 50 MCG
2 SPRAY, SUSPENSION (ML) NASAL DAILY
COMMUNITY

## 2019-06-17 NOTE — PATIENT INSTRUCTIONS
Amy Bergman's SCREENING SCHEDULE   Tests on this list are recommended by your physician but may not be covered, or covered at this frequency, by your insurer. Please check with your insurance carrier before scheduling to verify coverage.    SHEKHAR Men who are 73-68 years old and have smoked more than 100 cigarettes in their lifetime   • Anyone with a family history    Colorectal Cancer Screening  Covered up to Age 76     Colonoscopy Screen   Covered every 10 years- more often if abnormal Colonoscopy Mammogram regularly   Immunizations      Influenza  Covered Annually No orders found for this or any previous visit.  Please get every year    Pneumococcal 13 (Prevnar)  Covered Once after 65 Orders placed or performed in visit on 06/03/16   • PNEUMOCOCCAL has information from the [address] [place] [address] regarding Advance Directives.

## 2019-06-17 NOTE — PROGRESS NOTES
HPI:   David Knowles is a 75year old female who presents for a Medicare Subsequent Annual Wellness visit (Pt already had Initial Annual Wellness). No complaints           Fall/Risk Assessment   She has been screened for Falls and is low risk:  [facility])  Cheryl Sagastume DO as PCP - [facility]    Patient Active Problem List:     Spinal stenosis of lumbar region    Wt Readings from Last 3 Encounters:  06/17/19 : 227 lb 8 oz  06/10/19 : 228 lb  05/20/19 : 225 lb 3.2 oz     Last Cholesterol Labs:   La endoscopy,exam (2/16/2011); other surgical history; spine surgery procedure unlisted; and cataract. Her family history includes Cancer in her father and paternal grandfather; Heart Disease in her mother.    SOCIAL HISTORY:   She  reports that she has nev ANNUAL    Breast cancer screening  -     [facility] SCREENING BILAT (CPT=77067); Future         Diet assessment: good     PLAN:  The patient indicates understanding of these issues and agrees to the plan. Reinforced healthy diet, lifestyle, and exercise.     No f > 65 Data entered on: 10/7/2016   Last Dilated Eye Exam 9/27/2016       Bone Density Screening      Dexascan Every two years Last Dexa Scan:   XR DEXA BONE DENSITOMETRY (CPT=77080) 06/13/2014    No flowsheet data found.     Pap and Pelvic      Pap: Potassium  Annually Potassium (mmol/L)   Date Value   05/08/2019 4.5     POTASSIUM (mmol/L)   Date Value   05/23/2014 4.3   07/13/2012 4.3    No flowsheet data found.     Creatinine  Annually CREATININE (mg/dL)   Date Value   05/23/2014 0.65     Creatinine

## 2019-06-18 RX ORDER — GABAPENTIN 400 MG/1
CAPSULE ORAL
Qty: 180 CAPSULE | Refills: 1 | Status: SHIPPED | OUTPATIENT
Start: 2019-06-18 | End: 2019-12-11

## 2019-06-21 ENCOUNTER — TELEPHONE (OUTPATIENT)
Dept: FAMILY MEDICINE CLINIC | Facility: CLINIC | Age: 74
End: 2019-06-21

## 2019-06-21 NOTE — TELEPHONE ENCOUNTER
It would be unusual for med reaction to only affect hands but safer to avoid zantac. Retry omeprazole but try to find the least frequent needed. Try every other day.

## 2019-06-21 NOTE — TELEPHONE ENCOUNTER
Patient states that her hands and fingers have been very itchy since starting the zantac. Patient states that itchiness is nowhere else. No sign of a rash. She has not been gardening or using different soap.   Patient believes that this could be a reacti

## 2019-07-01 RX ORDER — OMEPRAZOLE 40 MG/1
CAPSULE, DELAYED RELEASE ORAL
Qty: 180 CAPSULE | Refills: 1 | OUTPATIENT
Start: 2019-07-01

## 2019-07-08 ENCOUNTER — TELEPHONE (OUTPATIENT)
Dept: FAMILY MEDICINE CLINIC | Facility: CLINIC | Age: 74
End: 2019-07-08

## 2019-07-08 RX ORDER — OMEPRAZOLE 40 MG/1
40 CAPSULE, DELAYED RELEASE ORAL EVERY OTHER DAY
Qty: 90 CAPSULE | Refills: 0 | Status: SHIPPED | OUTPATIENT
Start: 2019-07-08 | End: 2020-01-10

## 2019-07-08 NOTE — TELEPHONE ENCOUNTER
Previous celia indicated that the patient was having reactions to the Zantac so Dr recommended that she go back on the Omeprazole but take QOD. I am calling the patient to confirm if she went back on her Omeprazole - the dose and how often?    Previous sc

## 2019-12-12 RX ORDER — GABAPENTIN 400 MG/1
CAPSULE ORAL
Qty: 180 CAPSULE | Refills: 1 | Status: SHIPPED | OUTPATIENT
Start: 2019-12-12 | End: 2020-06-05

## 2019-12-17 ENCOUNTER — MED REC SCAN ONLY (OUTPATIENT)
Dept: FAMILY MEDICINE CLINIC | Facility: CLINIC | Age: 74
End: 2019-12-17

## 2019-12-20 ENCOUNTER — OFFICE VISIT (OUTPATIENT)
Dept: FAMILY MEDICINE CLINIC | Facility: CLINIC | Age: 74
End: 2019-12-20
Payer: MEDICARE

## 2019-12-20 VITALS
DIASTOLIC BLOOD PRESSURE: 72 MMHG | BODY MASS INDEX: 38.87 KG/M2 | SYSTOLIC BLOOD PRESSURE: 120 MMHG | TEMPERATURE: 97 F | OXYGEN SATURATION: 96 % | HEART RATE: 84 BPM | WEIGHT: 219.38 LBS | HEIGHT: 63 IN

## 2019-12-20 DIAGNOSIS — M15.9 PRIMARY OSTEOARTHRITIS INVOLVING MULTIPLE JOINTS: Primary | ICD-10-CM

## 2019-12-20 DIAGNOSIS — I87.8 VENOUS STASIS: ICD-10-CM

## 2019-12-20 PROCEDURE — 99214 OFFICE O/P EST MOD 30 MIN: CPT | Performed by: FAMILY MEDICINE

## 2019-12-20 NOTE — PROGRESS NOTES
Penny Solorzano is a 75year old female. Patient presents with: Other: 6 month fup, med check. ..rom 1      HPI:   Patient has been doing well. She continues to struggle with her diffuse osteoarthritis.   Her biggest issues are with her hands and feet an cancer at age 60   • Heart Disease Mother         Mother  at age 80 of CAD    • Cancer Paternal Grandfather         Rectal cancer        Social History:  Social History    Tobacco Use      Smoking status: Never Smoker      Smokeless tobacco: Never Used well as prognosis and expectations. No orders of the defined types were placed in this encounter.       Meds & Refills for this Visit:  Requested Prescriptions      No prescriptions requested or ordered in this encounter       Imaging & Consults:  None

## 2020-01-10 RX ORDER — OMEPRAZOLE 40 MG/1
40 CAPSULE, DELAYED RELEASE ORAL EVERY OTHER DAY
Qty: 45 CAPSULE | Refills: 1 | Status: SHIPPED | OUTPATIENT
Start: 2020-01-10 | End: 2020-07-06

## 2020-02-29 ENCOUNTER — TELEPHONE (OUTPATIENT)
Dept: FAMILY MEDICINE CLINIC | Facility: CLINIC | Age: 75
End: 2020-02-29

## 2020-02-29 NOTE — TELEPHONE ENCOUNTER
GABAPENTIN 400 MG Oral Cap and OMEPRAZOLE 40 MG Oral Capsule Delayed Release would like to know if she can get 90 day refills on these she knows that they aren't due for refills yet but she would like to stock up

## 2020-02-29 NOTE — TELEPHONE ENCOUNTER
Pt reports she is concerned about Corona Virus. Wants to have plenty of meds available in case quarantine is advised in next couple of months. Pt reports she is very healthy. Is not presenting with any symptoms at this time.      Advised to perform good

## 2020-03-21 ENCOUNTER — TELEPHONE (OUTPATIENT)
Dept: FAMILY MEDICINE CLINIC | Facility: CLINIC | Age: 75
End: 2020-03-21

## 2020-03-21 NOTE — TELEPHONE ENCOUNTER
Patient states that she has post nasal drip. No cough. No fever. No wheezing, shortness of breath or difficulty breathing. Patient is asking what she can do for for symptoms. Symptoms started 3 days ago.   Per Dr. Analy Cobos, saline in nose flonase, mirza

## 2020-03-26 ENCOUNTER — TELEPHONE (OUTPATIENT)
Dept: FAMILY MEDICINE CLINIC | Facility: CLINIC | Age: 75
End: 2020-03-26

## 2020-03-26 NOTE — TELEPHONE ENCOUNTER
Talked with patient she stated that she will start taking mucinex. Told patient to call back is she feels worse or don't get better.

## 2020-04-03 ENCOUNTER — TELEPHONE (OUTPATIENT)
Dept: FAMILY MEDICINE CLINIC | Facility: CLINIC | Age: 75
End: 2020-04-03

## 2020-04-03 DIAGNOSIS — J01.00 ACUTE MAXILLARY SINUSITIS, RECURRENCE NOT SPECIFIED: Primary | ICD-10-CM

## 2020-04-03 PROCEDURE — G2012 BRIEF CHECK IN BY MD/QHP: HCPCS | Performed by: FAMILY MEDICINE

## 2020-04-03 RX ORDER — AMOXICILLIN 875 MG/1
875 TABLET, COATED ORAL 2 TIMES DAILY
Qty: 20 TABLET | Refills: 0 | Status: SHIPPED | OUTPATIENT
Start: 2020-04-03 | End: 2020-06-29 | Stop reason: ALTCHOICE

## 2020-04-03 RX ORDER — PREDNISONE 20 MG/1
40 TABLET ORAL DAILY
Qty: 10 TABLET | Refills: 0 | Status: SHIPPED | OUTPATIENT
Start: 2020-04-03 | End: 2020-04-08

## 2020-04-03 NOTE — TELEPHONE ENCOUNTER
Virtual/Telephone Check-In    Amy Rajput Nba verbally consents to a Virtual/Telephone Check-In service on 04/03/20. Patient understands and accepts financial responsibility for any deductible, co-insurance and/or co-pays associated with this service.

## 2020-04-03 NOTE — TELEPHONE ENCOUNTER
Sick no cough no fever a lot of drainage in back of throat. Been taking sudafed/musinex. Denies being around anyone who is sick.       Ok'd tele visit/ other doctor

## 2020-06-05 RX ORDER — GABAPENTIN 400 MG/1
400 CAPSULE ORAL 2 TIMES DAILY
Qty: 180 CAPSULE | Refills: 1 | Status: SHIPPED | OUTPATIENT
Start: 2020-06-05 | End: 2020-12-07

## 2020-06-29 ENCOUNTER — OFFICE VISIT (OUTPATIENT)
Dept: FAMILY MEDICINE CLINIC | Facility: CLINIC | Age: 75
End: 2020-06-29
Payer: MEDICARE

## 2020-06-29 ENCOUNTER — LABORATORY ENCOUNTER (OUTPATIENT)
Dept: LAB | Age: 75
End: 2020-06-29
Attending: FAMILY MEDICINE
Payer: MEDICARE

## 2020-06-29 VITALS
DIASTOLIC BLOOD PRESSURE: 80 MMHG | RESPIRATION RATE: 16 BRPM | OXYGEN SATURATION: 98 % | TEMPERATURE: 98 F | HEIGHT: 63 IN | BODY MASS INDEX: 39.2 KG/M2 | HEART RATE: 86 BPM | WEIGHT: 221.25 LBS | SYSTOLIC BLOOD PRESSURE: 124 MMHG

## 2020-06-29 DIAGNOSIS — I87.8 VENOUS STASIS: ICD-10-CM

## 2020-06-29 DIAGNOSIS — L21.9 SEBORRHEIC DERMATITIS: ICD-10-CM

## 2020-06-29 DIAGNOSIS — M15.9 PRIMARY OSTEOARTHRITIS INVOLVING MULTIPLE JOINTS: Primary | ICD-10-CM

## 2020-06-29 DIAGNOSIS — R53.82 CHRONIC FATIGUE: ICD-10-CM

## 2020-06-29 DIAGNOSIS — E78.5 HYPERLIPIDEMIA, UNSPECIFIED HYPERLIPIDEMIA TYPE: ICD-10-CM

## 2020-06-29 LAB
ALBUMIN SERPL-MCNC: 3.8 G/DL (ref 3.4–5)
ALBUMIN/GLOB SERPL: 1.2 {RATIO} (ref 1–2)
ALP LIVER SERPL-CCNC: 88 U/L (ref 55–142)
ALT SERPL-CCNC: 26 U/L (ref 13–56)
ANION GAP SERPL CALC-SCNC: 8 MMOL/L (ref 0–18)
AST SERPL-CCNC: 20 U/L (ref 15–37)
BASOPHILS # BLD AUTO: 0.06 X10(3) UL (ref 0–0.2)
BASOPHILS NFR BLD AUTO: 1.2 %
BILIRUB SERPL-MCNC: 0.3 MG/DL (ref 0.1–2)
BUN BLD-MCNC: 17 MG/DL (ref 7–18)
BUN/CREAT SERPL: 18.7 (ref 10–20)
CALCIUM BLD-MCNC: 8.4 MG/DL (ref 8.5–10.1)
CHLORIDE SERPL-SCNC: 108 MMOL/L (ref 98–112)
CHOLEST SMN-MCNC: 201 MG/DL (ref ?–200)
CO2 SERPL-SCNC: 24 MMOL/L (ref 21–32)
CREAT BLD-MCNC: 0.91 MG/DL (ref 0.55–1.02)
DEPRECATED RDW RBC AUTO: 50.2 FL (ref 35.1–46.3)
EOSINOPHIL # BLD AUTO: 0.1 X10(3) UL (ref 0–0.7)
EOSINOPHIL NFR BLD AUTO: 1.9 %
ERYTHROCYTE [DISTWIDTH] IN BLOOD BY AUTOMATED COUNT: 14.1 % (ref 11–15)
GLOBULIN PLAS-MCNC: 3.3 G/DL (ref 2.8–4.4)
GLUCOSE BLD-MCNC: 150 MG/DL (ref 70–99)
HCT VFR BLD AUTO: 44.5 % (ref 35–48)
HDLC SERPL-MCNC: 37 MG/DL (ref 40–59)
HGB BLD-MCNC: 14.6 G/DL (ref 12–16)
IMM GRANULOCYTES # BLD AUTO: 0.01 X10(3) UL (ref 0–1)
IMM GRANULOCYTES NFR BLD: 0.2 %
LDLC SERPL CALC-MCNC: 123 MG/DL (ref ?–100)
LYMPHOCYTES # BLD AUTO: 1.73 X10(3) UL (ref 1–4)
LYMPHOCYTES NFR BLD AUTO: 33.2 %
M PROTEIN MFR SERPL ELPH: 7.1 G/DL (ref 6.4–8.2)
MCH RBC QN AUTO: 31.7 PG (ref 26–34)
MCHC RBC AUTO-ENTMCNC: 32.8 G/DL (ref 31–37)
MCV RBC AUTO: 96.5 FL (ref 80–100)
MONOCYTES # BLD AUTO: 0.36 X10(3) UL (ref 0.1–1)
MONOCYTES NFR BLD AUTO: 6.9 %
NEUTROPHILS # BLD AUTO: 2.95 X10 (3) UL (ref 1.5–7.7)
NEUTROPHILS # BLD AUTO: 2.95 X10(3) UL (ref 1.5–7.7)
NEUTROPHILS NFR BLD AUTO: 56.6 %
NONHDLC SERPL-MCNC: 164 MG/DL (ref ?–130)
OSMOLALITY SERPL CALC.SUM OF ELEC: 294 MOSM/KG (ref 275–295)
PATIENT FASTING Y/N/NP: YES
PATIENT FASTING Y/N/NP: YES
PLATELET # BLD AUTO: 229 10(3)UL (ref 150–450)
POTASSIUM SERPL-SCNC: 4 MMOL/L (ref 3.5–5.1)
RBC # BLD AUTO: 4.61 X10(6)UL (ref 3.8–5.3)
SODIUM SERPL-SCNC: 140 MMOL/L (ref 136–145)
TRIGL SERPL-MCNC: 207 MG/DL (ref 30–149)
TSI SER-ACNC: 1.2 MIU/ML (ref 0.36–3.74)
VLDLC SERPL CALC-MCNC: 41 MG/DL (ref 0–30)
WBC # BLD AUTO: 5.2 X10(3) UL (ref 4–11)

## 2020-06-29 PROCEDURE — 85025 COMPLETE CBC W/AUTO DIFF WBC: CPT

## 2020-06-29 PROCEDURE — 36415 COLL VENOUS BLD VENIPUNCTURE: CPT

## 2020-06-29 PROCEDURE — 80053 COMPREHEN METABOLIC PANEL: CPT

## 2020-06-29 PROCEDURE — 99214 OFFICE O/P EST MOD 30 MIN: CPT | Performed by: FAMILY MEDICINE

## 2020-06-29 PROCEDURE — 80061 LIPID PANEL: CPT

## 2020-06-29 PROCEDURE — 84443 ASSAY THYROID STIM HORMONE: CPT

## 2020-06-29 NOTE — PROGRESS NOTES
Aimee Dailey is a 67year old female. Patient presents with:   Foot Pain: Foot pain she stated that it is in both feet and going up to ankels she stated that she has been having this for the last 6 months off and on , she stated that her  ankels get co E[facility] MAIN OR   • CATARACT     • CHOLECYSTECTOMY     • COLONOSCOPY     • OTHER SURGICAL HISTORY      R breast stereotactic bx   • SPINE SURGERY PROCEDURE UNLISTED     • TONSILLECTOMY     • UPPER GI ENDOSCOPY,EXAM  2/16/2011    GUTIERREZ Gonzalez     Family History unspecified hyperlipidemia type  Seborrheic dermatitis    Recommend support stockings. Told her to put them on in the morning so she gets up and leave them on until she goes to bed. Explained how the edema forms.   We will check thyroid, blood count, chem

## 2020-07-06 RX ORDER — OMEPRAZOLE 40 MG/1
40 CAPSULE, DELAYED RELEASE ORAL EVERY OTHER DAY
Qty: 45 CAPSULE | Refills: 1 | Status: SHIPPED | OUTPATIENT
Start: 2020-07-06 | End: 2021-01-04

## 2020-07-06 NOTE — TELEPHONE ENCOUNTER
LOV:  6/29/2020     LAB:    6/29/20    LRX:    OMEPRAZOLE 40 MG Oral Capsule Delayed Release 45 capsule 1 1/10/2020    NOV:     Future Appointments   Date Time Provider Janie Moulton   7/21/2020 10:00 AM DO MEL KimbleSW [facility] [place]

## 2020-07-09 ENCOUNTER — OFFICE VISIT (OUTPATIENT)
Dept: FAMILY MEDICINE CLINIC | Facility: CLINIC | Age: 75
End: 2020-07-09
Payer: MEDICARE

## 2020-07-09 VITALS
RESPIRATION RATE: 12 BRPM | HEIGHT: 63 IN | SYSTOLIC BLOOD PRESSURE: 138 MMHG | DIASTOLIC BLOOD PRESSURE: 80 MMHG | WEIGHT: 221 LBS | BODY MASS INDEX: 39.16 KG/M2 | HEART RATE: 81 BPM | TEMPERATURE: 98 F

## 2020-07-09 DIAGNOSIS — H65.01 NON-RECURRENT ACUTE SEROUS OTITIS MEDIA OF RIGHT EAR: ICD-10-CM

## 2020-07-09 DIAGNOSIS — H92.01 ACUTE OTALGIA, RIGHT: ICD-10-CM

## 2020-07-09 DIAGNOSIS — J01.90 ACUTE RHINOSINUSITIS: Primary | ICD-10-CM

## 2020-07-09 PROCEDURE — 99213 OFFICE O/P EST LOW 20 MIN: CPT | Performed by: FAMILY MEDICINE

## 2020-07-09 RX ORDER — PREDNISONE 20 MG/1
TABLET ORAL
Qty: 15 TABLET | Refills: 0 | Status: SHIPPED | OUTPATIENT
Start: 2020-07-09 | End: 2020-07-19

## 2020-07-09 RX ORDER — AZITHROMYCIN 250 MG/1
TABLET, FILM COATED ORAL
Qty: 6 TABLET | Refills: 0 | Status: SHIPPED | OUTPATIENT
Start: 2020-07-09 | End: 2020-07-14

## 2020-07-15 NOTE — PATIENT INSTRUCTIONS
.  Azithromycin tablets  Brand Names: Zithromax, Zithromax Tri-Dexter, Zithromax Z-Dexter  What is this medicine? AZITHROMYCIN (az ith mohsen MYE sin) is a macrolide antibiotic. It is used to treat or prevent certain kinds of bacterial infections.  It will not work This medicine may also interact with the following medications:  · amiodarone  · antacids  · birth control pills  · cyclosporine  · digoxin  · magnesium  · nelfinavir  · phenytoin  · warfarin  What if I miss a dose?   If you miss a dose, take it as soon as The sinuses are air-filled spaces within the bones of the face. They connect to the inside of the nose. Sinusitis is an inflammation of the tissue lining the sinus cavity. Sinus inflammation can occur during a cold.  It can also be due to allergies to polle · Do not use nasal rinses or irrigation during an acute sinus infection, unless told to by your health care provider. Rinsing may spread the infection to other sinuses.   · Use acetaminophen or ibuprofen to control pain, unless another pain medicine was pre

## 2020-07-15 NOTE — PROGRESS NOTES
Sonia Arndt is a 67year old female. Patient presents with:  Ear Pain: right . inrm 6      Chief Complaint Reviewed and Verified  Nursing Notes Reviewed and   Verified  Tobacco Reviewed  Allergies Reviewed  Medications Reviewed    Problem List Reviewe : 228 lb (103.4 kg)  05/20/19 : 225 lb 3.2 oz (102.2 kg)      REVIEW OF SYSTEMS:   GENERAL HEALTH: feels well no complaints  SKIN: denies any unusual skin lesions or rashes  ENT  See HPI    RESPIRATORY: denies shortness of breath with exertion  CARDIOVASCU Lisa Ryan M.D., FAAFP      The patient indicates understanding of these issues and agrees to the plan.

## 2020-07-21 ENCOUNTER — OFFICE VISIT (OUTPATIENT)
Dept: FAMILY MEDICINE CLINIC | Facility: CLINIC | Age: 75
End: 2020-07-21
Payer: MEDICARE

## 2020-07-21 VITALS
TEMPERATURE: 97 F | BODY MASS INDEX: 38.98 KG/M2 | DIASTOLIC BLOOD PRESSURE: 80 MMHG | SYSTOLIC BLOOD PRESSURE: 110 MMHG | OXYGEN SATURATION: 96 % | WEIGHT: 220 LBS | HEIGHT: 63 IN | HEART RATE: 82 BPM

## 2020-07-21 DIAGNOSIS — M15.9 PRIMARY OSTEOARTHRITIS INVOLVING MULTIPLE JOINTS: Primary | ICD-10-CM

## 2020-07-21 DIAGNOSIS — Z12.31 VISIT FOR SCREENING MAMMOGRAM: ICD-10-CM

## 2020-07-21 DIAGNOSIS — Z13.31 DEPRESSION SCREENING: ICD-10-CM

## 2020-07-21 DIAGNOSIS — M48.061 SPINAL STENOSIS OF LUMBAR REGION WITHOUT NEUROGENIC CLAUDICATION: ICD-10-CM

## 2020-07-21 DIAGNOSIS — Z00.00 ENCOUNTER FOR ANNUAL HEALTH EXAMINATION: ICD-10-CM

## 2020-07-21 PROCEDURE — G0439 PPPS, SUBSEQ VISIT: HCPCS | Performed by: FAMILY MEDICINE

## 2020-07-21 PROCEDURE — G0444 DEPRESSION SCREEN ANNUAL: HCPCS | Performed by: FAMILY MEDICINE

## 2020-07-21 NOTE — PROGRESS NOTES
HPI:   Mariah Ross is a 67year old female who presents for a Medicare Subsequent Annual Wellness visit (Pt already had Initial Annual Wellness).     No complaints          Fall/Risk Assessment   She has been screened for Falls and is low risk: Fall/ DO as PCP - [facility]    Patient Active Problem List:     Spinal stenosis of lumbar region     Primary osteoarthritis involving multiple joints    Wt Readings from Last 3 Encounters:  07/21/20 : 220 lb (99.8 kg)  07/09/20 : 221 lb (100.2 kg)  06/29/20 : 221 lb grandfather; Heart Disease in her mother. SOCIAL HISTORY:   She  reports that she has never smoked. She has never used smokeless tobacco. She reports that she does not drink alcohol or use drugs.      REVIEW OF SYSTEMS:   GENERAL: feels well otherwise  SK thyroid: not enlarged, symmetric, no tenderness/mass/nodules; no carotid bruit or JVD   Back:   Symmetric, no curvature, ROM normal, no CVA tenderness   Lungs:   Clear to auscultation bilaterally, respirations unlabored   Heart:  Regular rate and rhythm, S SCREENING BILAT (CPT=77067);  Future    Depression screening  -     DEPRESSION SCREEN ANNUAL    Spinal stenosis of lumbar region without neurogenic claudication         Diet assessment: good     PLAN:  The patient indicates understanding of these issues and Screening      Ophthalmology Visit Annually: Diabetics, FHx Glaucoma, AA>50, > 65 Data entered on: 10/7/2016   Last Dilated Eye Exam 9/27/2016       Bone Density Screening      Dexascan Every two years Last Dexa Scan:   XR DEXA BONE DENSITOMETRY (C diuretics, anticonvulsants.)    Potassium  Annually Potassium (mmol/L)   Date Value   06/29/2020 4.0     POTASSIUM (mmol/L)   Date Value   05/23/2014 4.3   07/13/2012 4.3    No flowsheet data found.     Creatinine  Annually CREATININE (mg/dL)   Date Value

## 2020-09-23 ENCOUNTER — OFFICE VISIT (OUTPATIENT)
Dept: FAMILY MEDICINE CLINIC | Facility: CLINIC | Age: 75
End: 2020-09-23
Payer: MEDICARE

## 2020-09-23 ENCOUNTER — HOSPITAL ENCOUNTER (OUTPATIENT)
Dept: GENERAL RADIOLOGY | Age: 75
Discharge: HOME | End: 2020-09-23
Attending: NURSE PRACTITIONER
Payer: MEDICARE

## 2020-09-23 ENCOUNTER — TELEPHONE (OUTPATIENT)
Dept: FAMILY MEDICINE CLINIC | Facility: CLINIC | Age: 75
End: 2020-09-23

## 2020-09-23 VITALS
HEART RATE: 86 BPM | WEIGHT: 222.13 LBS | DIASTOLIC BLOOD PRESSURE: 80 MMHG | TEMPERATURE: 99 F | BODY MASS INDEX: 39.36 KG/M2 | OXYGEN SATURATION: 99 % | RESPIRATION RATE: 16 BRPM | HEIGHT: 63 IN | SYSTOLIC BLOOD PRESSURE: 120 MMHG

## 2020-09-23 DIAGNOSIS — K59.09 OTHER CONSTIPATION: ICD-10-CM

## 2020-09-23 DIAGNOSIS — R10.31 RLQ ABDOMINAL PAIN: ICD-10-CM

## 2020-09-23 DIAGNOSIS — R82.90 ABNORMAL URINE ODOR: ICD-10-CM

## 2020-09-23 DIAGNOSIS — R10.31 RLQ ABDOMINAL PAIN: Primary | ICD-10-CM

## 2020-09-23 LAB
APPEARANCE: CLEAR
BILIRUBIN: NEGATIVE
GLUCOSE (URINE DIPSTICK): NEGATIVE MG/DL
KETONES (URINE DIPSTICK): NEGATIVE MG/DL
MULTISTIX LOT#: 1044 NUMERIC
NITRITE, URINE: NEGATIVE
OCCULT BLOOD: NEGATIVE
PH, URINE: 6 (ref 4.5–8)
PROTEIN (URINE DIPSTICK): NEGATIVE MG/DL
SPECIFIC GRAVITY: 1.01 (ref 1–1.03)
URINE-COLOR: YELLOW
UROBILINOGEN,SEMI-QN: 0.2 MG/DL (ref 0–1.9)

## 2020-09-23 PROCEDURE — 87086 URINE CULTURE/COLONY COUNT: CPT | Performed by: NURSE PRACTITIONER

## 2020-09-23 PROCEDURE — 87077 CULTURE AEROBIC IDENTIFY: CPT | Performed by: NURSE PRACTITIONER

## 2020-09-23 PROCEDURE — 87186 SC STD MICRODIL/AGAR DIL: CPT | Performed by: NURSE PRACTITIONER

## 2020-09-23 PROCEDURE — 99214 OFFICE O/P EST MOD 30 MIN: CPT | Performed by: NURSE PRACTITIONER

## 2020-09-23 PROCEDURE — 81003 URINALYSIS AUTO W/O SCOPE: CPT | Performed by: NURSE PRACTITIONER

## 2020-09-23 PROCEDURE — 74018 RADEX ABDOMEN 1 VIEW: CPT | Performed by: NURSE PRACTITIONER

## 2020-09-23 NOTE — TELEPHONE ENCOUNTER
No openings w/Dr. Kelsey Torres. Appt scheduled with NP.     Future Appointments   Date Time Provider [facility]   9/23/2020  1:15 PM MAXINE Mackay [facility] [place]

## 2020-09-23 NOTE — TELEPHONE ENCOUNTER
Pt advised. She is concerned about  RLQ intermittent pain x couple of weeks. Worse when she has a \"hard stool\"    She had the same sx about 1.5 yrs ago. Had a CT abd/pelvis 2/2019, results came back normal.    [place] wondering what this could be.  Should

## 2020-09-23 NOTE — PROGRESS NOTES
HPI: HPI   Patient is here for pain in her right lower abdomen. She had a similar back last year (in 2/2019). The pain has been occurring intermittently throughout the day for the past 2-3 weeks.  Discomfort not worsening but since it is persisting, she R breast stereotactic bx   • TONSILLECTOMY     • UPPER GI ENDOSCOPY,EXAM  2011    [place], GERD      Family History   Problem Relation Age of Onset   • Cancer Father         father  of colon cancer at age 61   • Heart Disease Mother         Moth Future    Abnormal urine odor  -     URINALYSIS, AUTO, W/O SCOPE  -     URINE CULTURE, ROUTINE; Future    Other constipation    Xray result: Moderate amount of fecal material noted within the visualized colon. Overall nonobstructive bowel gas pattern.   If

## 2020-09-23 NOTE — TELEPHONE ENCOUNTER
Pt had her flu and pneumonia shot at [facility] in [place]. Should she get the shingles shot?

## 2020-09-23 NOTE — TELEPHONE ENCOUNTER
Amy is calling she was wondering if the results of her x-ray are in yet, she said that she will be home about 4:30 to please call her

## 2020-09-24 ENCOUNTER — TELEPHONE (OUTPATIENT)
Dept: FAMILY MEDICINE CLINIC | Facility: CLINIC | Age: 75
End: 2020-09-24

## 2020-09-24 NOTE — TELEPHONE ENCOUNTER
----- Message from MAXINE Wilder sent at 9/23/2020  4:16 PM CDT -----  Results reviewed. Constipation.  Can cancel CT and take Miralax daily until bowel movements return to normal.

## 2020-09-25 RX ORDER — NITROFURANTOIN 25; 75 MG/1; MG/1
100 CAPSULE ORAL 2 TIMES DAILY
Qty: 14 CAPSULE | Refills: 0 | Status: SHIPPED | OUTPATIENT
Start: 2020-09-25 | End: 2020-10-02

## 2020-11-18 ENCOUNTER — TELEPHONE (OUTPATIENT)
Dept: FAMILY MEDICINE CLINIC | Facility: CLINIC | Age: 75
End: 2020-11-18

## 2020-11-18 NOTE — TELEPHONE ENCOUNTER
CALL PT ABOUT [employer]AP[employer]WORK SHE GOT IN MAIL, QUESTION ON TESTS THEY ARE OFFERING OR IF WE DO THEM HERE?

## 2020-11-18 NOTE — TELEPHONE ENCOUNTER
\"[employer]\" company is offering her screening tests d/t age and family history.     These tests include vitamin D, thyroid, lung cancer screening test, COPD test, cardiac test, etc.    She would be paying for all these tests out of pocket at [facility]

## 2020-11-21 ENCOUNTER — TELEPHONE (OUTPATIENT)
Dept: FAMILY MEDICINE CLINIC | Facility: CLINIC | Age: 75
End: 2020-11-21

## 2020-11-21 NOTE — TELEPHONE ENCOUNTER
She has sinus drainage that is making her ears plug up.   she wants to know what she can do to help this

## 2020-11-21 NOTE — TELEPHONE ENCOUNTER
Sinus drainage for past 2 days down throat; this morning ears feel plugged, no fever; hasn't been around anyone with covid. She is using Flonase. NKA. [facility]ses [facility]. Asking what else she can take for sensation of  Plugged ears.

## 2020-11-25 ENCOUNTER — TELEPHONE (OUTPATIENT)
Dept: FAMILY MEDICINE CLINIC | Facility: CLINIC | Age: 75
End: 2020-11-25

## 2020-11-25 ENCOUNTER — OFFICE VISIT (OUTPATIENT)
Dept: FAMILY MEDICINE CLINIC | Facility: CLINIC | Age: 75
End: 2020-11-25
Payer: MEDICARE

## 2020-11-25 VITALS — TEMPERATURE: 98 F | OXYGEN SATURATION: 99 % | HEART RATE: 88 BPM

## 2020-11-25 DIAGNOSIS — J06.9 VIRAL URI WITH COUGH: Primary | ICD-10-CM

## 2020-11-25 PROCEDURE — 99213 OFFICE O/P EST LOW 20 MIN: CPT | Performed by: NURSE PRACTITIONER

## 2020-11-25 NOTE — TELEPHONE ENCOUNTER
Sinus drainage, headache, fatigue since last week. She was advised to take sudafed and flonase. Only noted a slight improvement. No known COVID exposure. Appt scheduled through resp clinic.     Future Appointments   Date Time Provider Department

## 2020-11-25 NOTE — PROGRESS NOTES
HPI:   Sinus Problem  This is a new problem. Episode onset: Saturday. The problem is unchanged. There has been no fever. Associated symptoms include congestion and coughing (slight).  Pertinent negatives include no chills, ear pain, headaches, hoarse voice, Cancer Paternal Grandfather         Rectal cancer      Social History    Tobacco Use      Smoking status: Never Smoker      Smokeless tobacco: Never Used    Alcohol use: No      Alcohol/week: 0.0 standard drinks      Frequency: Never      Comment: rare-win

## 2020-11-25 NOTE — TELEPHONE ENCOUNTER
Amy is calling she is still having sinus issues, she would like to know if she should keep taking the over the counter medications.   Amy would also like to know if this sounds like COVID, please advise

## 2020-11-28 ENCOUNTER — TELEPHONE (OUTPATIENT)
Dept: FAMILY MEDICINE CLINIC | Facility: CLINIC | Age: 75
End: 2020-11-28

## 2020-11-28 RX ORDER — AZITHROMYCIN 250 MG/1
TABLET, FILM COATED ORAL
Qty: 6 TABLET | Refills: 0 | Status: SHIPPED | OUTPATIENT
Start: 2020-11-28 | End: 2020-12-03

## 2020-11-28 NOTE — TELEPHONE ENCOUNTER
Patient states that she seen Mandy Hurtado last week and was told to call us today with an update. Mucinex has helped and she is feeling much better, but ears are still clogged. Can feel the liquid in her right ear. Should she be seen?

## 2020-11-28 NOTE — TELEPHONE ENCOUNTER
Seen 11/25 by Terra Holland. COVID NEGATIVE. [place] states she continues to have fluid in her ears. She states Terra Holland noted this when she was seen on 11/25, advised to call Saturday if symptoms did not improve so that she could prescribe an antibiotic.      Artisti

## 2020-12-07 ENCOUNTER — TELEPHONE (OUTPATIENT)
Dept: FAMILY MEDICINE CLINIC | Facility: CLINIC | Age: 75
End: 2020-12-07

## 2020-12-07 RX ORDER — GABAPENTIN 400 MG/1
CAPSULE ORAL
Qty: 180 CAPSULE | Refills: 1 | Status: SHIPPED | OUTPATIENT
Start: 2020-12-07

## 2020-12-21 ENCOUNTER — OFFICE VISIT (OUTPATIENT)
Dept: FAMILY MEDICINE CLINIC | Facility: CLINIC | Age: 75
End: 2020-12-21
Payer: MEDICARE

## 2020-12-21 VITALS
SYSTOLIC BLOOD PRESSURE: 120 MMHG | HEIGHT: 63 IN | WEIGHT: 220.38 LBS | HEART RATE: 83 BPM | OXYGEN SATURATION: 96 % | BODY MASS INDEX: 39.05 KG/M2 | DIASTOLIC BLOOD PRESSURE: 80 MMHG | TEMPERATURE: 98 F

## 2020-12-21 DIAGNOSIS — R10.30 LOWER ABDOMINAL PAIN: ICD-10-CM

## 2020-12-21 DIAGNOSIS — N76.0 ACUTE VAGINITIS: Primary | ICD-10-CM

## 2020-12-21 DIAGNOSIS — Z12.31 VISIT FOR SCREENING MAMMOGRAM: ICD-10-CM

## 2020-12-21 PROCEDURE — 99213 OFFICE O/P EST LOW 20 MIN: CPT | Performed by: FAMILY MEDICINE

## 2020-12-21 PROCEDURE — 81003 URINALYSIS AUTO W/O SCOPE: CPT | Performed by: FAMILY MEDICINE

## 2020-12-21 RX ORDER — FLUCONAZOLE 150 MG/1
150 TABLET ORAL ONCE
Qty: 2 TABLET | Refills: 0 | Status: SHIPPED | OUTPATIENT
Start: 2020-12-21 | End: 2020-12-21

## 2020-12-21 NOTE — PROGRESS NOTES
Courtney Espinoza      is a 67year old female. Patient presents with:  Abdominal Pain: lower abdominal discomfort today-also some vaginal itching, discomfort-did use monistat this am-it did help. ...room 1      HPI:   Patient had mild itching vaginally francisco TONSILLECTOMY     • UPPER GI ENDOSCOPY,EXAM  2011    [place], GERD     Family History   Problem Relation Age of Onset   • Cancer Father         father  of colon cancer at age 61   • Heart Disease Mother         Mother  at age 80 of CAD    • Canc Negative mg/dL    Spec Gravity 1.010 1.005 - 1.030    Blood Urine negative Negative    PH Urine 5.5 4.5 - 8.0    Protein Urine negative Negative/Trace mg/dL    Urobilinogen Urine 0.2 0.0 - 1.9 mg/dL    Nitrite Urine negative Negative    Leukocyte Esterase

## 2021-01-04 RX ORDER — OMEPRAZOLE 40 MG/1
40 CAPSULE, DELAYED RELEASE ORAL EVERY OTHER DAY
Qty: 45 CAPSULE | Refills: 1 | Status: SHIPPED | OUTPATIENT
Start: 2021-01-04 | End: 2021-07-03

## 2021-01-04 NOTE — TELEPHONE ENCOUNTER
Last refill #45 x 1 on 7/6/2020  Last office visit pertaining to refill on 12/21/2020  Future Appointments   Date Time Provider Janie [place]   1/6/2021 10:45 AM Maria G Cabrera DO [facility] [place]

## 2021-01-20 ENCOUNTER — TELEPHONE (OUTPATIENT)
Dept: FAMILY MEDICINE CLINIC | Facility: CLINIC | Age: 76
End: 2021-01-20

## 2021-01-20 NOTE — TELEPHONE ENCOUNTER
Mammogram from 1/19/21 is normal.    Recheck in 1 yr is recommended by Dr. Lorri Sandhoff. Left detailed msg for pt. Advised to call back with any questions.

## 2021-02-26 ENCOUNTER — TELEPHONE (OUTPATIENT)
Dept: FAMILY MEDICINE CLINIC | Facility: CLINIC | Age: 76
End: 2021-02-26

## 2021-02-26 DIAGNOSIS — M25.571 BILATERAL ANKLE PAIN, UNSPECIFIED CHRONICITY: ICD-10-CM

## 2021-02-26 DIAGNOSIS — M25.561 PAIN IN BOTH KNEES, UNSPECIFIED CHRONICITY: Primary | ICD-10-CM

## 2021-02-26 DIAGNOSIS — M25.562 PAIN IN BOTH KNEES, UNSPECIFIED CHRONICITY: Primary | ICD-10-CM

## 2021-02-26 DIAGNOSIS — M25.572 BILATERAL ANKLE PAIN, UNSPECIFIED CHRONICITY: ICD-10-CM

## 2021-02-26 DIAGNOSIS — M79.642 BILATERAL HAND PAIN: ICD-10-CM

## 2021-02-26 DIAGNOSIS — M79.641 BILATERAL HAND PAIN: ICD-10-CM

## 2021-02-26 NOTE — TELEPHONE ENCOUNTER
Current recommendations are a bone density test after menopause and if normal, another one is not needed. If she really wants to have another one we can place the order but is not medically needed.

## 2021-02-26 NOTE — TELEPHONE ENCOUNTER
Amy advised about DEXA scan. She reports ongoing pain to hands, knees and ankles. She takes alleve which relieves the pain, but wants to see a neurologist for a second opinion.  She will hold off on the DEXA scan until she can see a neurologist.    Yuli Jerome

## 2021-02-26 NOTE — TELEPHONE ENCOUNTER
Amy is calling she would like a referral for [facility] in [place].   Also Amy would like an order for a DEXA sent to [facility] in [place]

## 2021-03-03 ENCOUNTER — TELEPHONE (OUTPATIENT)
Dept: FAMILY MEDICINE CLINIC | Facility: CLINIC | Age: 76
End: 2021-03-03

## 2021-03-08 ENCOUNTER — TELEPHONE (OUTPATIENT)
Dept: FAMILY MEDICINE CLINIC | Facility: CLINIC | Age: 76
End: 2021-03-08

## 2021-03-12 DIAGNOSIS — Z23 NEED FOR VACCINATION: ICD-10-CM

## 2021-03-26 LAB
AMB EXT HEMATOCRIT: 43.1
AMB EXT HEMOGLOBIN: 14.8
AMB EXT MCV: 92.7
AMB EXT PLATELETS: 248

## 2021-03-30 ENCOUNTER — TELEPHONE (OUTPATIENT)
Dept: FAMILY MEDICINE CLINIC | Facility: CLINIC | Age: 76
End: 2021-03-30

## 2021-03-30 LAB
ANION GAP: 9 MMOL/L (ref 0–18)
BUN: 22 MG/DL (ref 7–18)
CHLORIDE: 107 MMOL/L (ref 98–112)
CO2: 25 MMOL/L (ref 21–32)
CREATININE: 0.75
GLUCOSE: 77 MG/DL (ref 70–99)
POTASSIUM: 4.1 MMOL/L (ref 3.5–5.1)
SODIUM: 141 MMOL/L (ref 136–145)

## 2021-07-02 NOTE — TELEPHONE ENCOUNTER
Last refill #45 x 1 on 1/4/2021  Last office visit pertaining to refill on 7/21/2020  No future appointments. Patient is due for annual physical.  Reminder letter sent.

## 2021-07-03 RX ORDER — OMEPRAZOLE 40 MG/1
40 CAPSULE, DELAYED RELEASE ORAL EVERY OTHER DAY
Qty: 45 CAPSULE | Refills: 1 | Status: SHIPPED | OUTPATIENT
Start: 2021-07-03

## 2021-10-20 ENCOUNTER — TELEPHONE (OUTPATIENT)
Dept: FAMILY MEDICINE CLINIC | Facility: CLINIC | Age: 76
End: 2021-10-20

## 2021-10-20 NOTE — TELEPHONE ENCOUNTER
We received a medical record request from [facility] medical group requesting all records sent to scan stat 10/21/21

## 2022-02-11 ENCOUNTER — TELEPHONE (OUTPATIENT)
Dept: FAMILY MEDICINE CLINIC | Facility: CLINIC | Age: 77
End: 2022-02-11

## 2022-02-12 ENCOUNTER — TELEPHONE (OUTPATIENT)
Dept: FAMILY MEDICINE CLINIC | Facility: CLINIC | Age: 77
End: 2022-02-12

## 2023-05-13 NOTE — TELEPHONE ENCOUNTER
Clarified that order is for the NP in the osteoporosis clinic as referral was ordered. Saray Quinn verbalized understanding. English

## (undated) DEVICE — CONT SPEC SURG FAXITRON WEDGE

## (undated) DEVICE — BANDAGE ELASTIC ACE 6\" X-LONG

## (undated) DEVICE — SUTURE MONOCRYL 4-0 PS-2

## (undated) DEVICE — SUTURE SILK 0 FSL

## (undated) DEVICE — GLOVE BIOGEL M SURG SZ 71/2

## (undated) DEVICE — KENDALL SCD EXPRESS SLEEVES, KNEE LENGTH, MEDIUM: Brand: KENDALL SCD

## (undated) DEVICE — BREAST-HERNIA-PORT CDS-LF: Brand: MEDLINE INDUSTRIES, INC.

## (undated) DEVICE — SUTURE VICRYL 3-0 SH

## (undated) DEVICE — SOL  .9 1000ML BTL

## (undated) NOTE — MR AVS SNAPSHOT
Mihir Garcia  [address] [address]  [phone]               Thank you for choosing us for your health care visit with [place] DO Michael.   We are glad to serve you and happy to provide you with this summ Assoc Dx: Other fatigue [R53.83]           TSH [E]    Complete by: Feb 06, 2017 (Approximate)    Assoc Dx: Other fatigue [R53.83]           Sed Katelynn Hackett (Automated) [E]    Complete by: Feb 06, 2017 (Approximate)    Assoc Dx:   Other fatigue [R5

## (undated) NOTE — Clinical Note
Jeramie Schmitt saw Jovany Gogo in my office today. 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 The recommendation is to perform lumpectomy at this location. I am scheduling her for surgery.   Thank you Brionna Rushing

## (undated) NOTE — MR AVS SNAPSHOT
[facility]  [address] [address]  [phone]               Thank you for choosing us for your health care visit with Nasir Boyd DO.   We are glad to serve you and happy to provide you with this summ You can access your MyChart to more actively manage your health care and view more details from this visit by going to [email or web address].   If you've recently had a stay at the Hospital you can access your discharge instructions in [address] by ivelisse Make half your plate fruits and vegetables Highly refined, white starches including white bread, rice and pasta   Eat plenty of protein, keep the fat content low Sugars:  sodas and sports drinks, candies and desserts   Eat plenty of low-fat dairy products

## (undated) NOTE — MR AVS SNAPSHOT
[facility]  [address] [address]  [phone]               Thank you for choosing us for your health care visit with Cyndi Escalona DO.   We are glad to serve you and happy to provide you with this summ Lv ([address]), [phone], [phone] [address] [address]     Phone:  [phone]    - Amoxicillin-Pot Clavulanate 875-125 MG Tabs  - methylPREDNISolone 4 MG Tbpk            MyChart     Visit [address]

## (undated) NOTE — MR AVS SNAPSHOT
[facility]  [address] [address]  [phone]               Thank you for choosing us for your health care visit with Bassam Buitrago DO.   We are glad to serve you and happy to provide you with this summ Date Value   05/23/2014 138*     CHOLESTEROL (mg/dL)   Date Value   05/23/2014 206*     TRIGLYCERIDES (mg/dL)   Date Value   05/23/2014 160*        EKG - covered if needed at Welcome to Medicare, and non-screening if indicated for medical reasons Electroca No flowsheet data found.     Recommended for 2 year anniversary or as ordered in After Visit Summary   Pap and Pelvic      Pap: Every 3 yrs age 21-65 or Pap+HPV every 5 yrs age 33-67, Covered every 2 yrs up to age 79 or Yearly if High Risk   Pap Melanee Rings [employer].no. org/publications/[email or web address]/personal_dec. pdf  An information packet, including necessary form from the [address] website. http://www. idph.state. [place]/public/books/advin.htm  A link to the Fortunato Grace Casi.tn

## (undated) NOTE — MR AVS SNAPSHOT
After Visit Summary   3/7/2018    River Garcia    MRN: [id number]           Visit Information     Date & Time  3/7/2018  1:45 PM Provider  Claudia Munguia [facility] [address], [address].  Phone  [phone] now offers Video Visits through [address] for adult and pediatric patients. Video Visits are available Monday - Friday for many common conditions such as allergies, colds, cough, fever, rash, sore throat, headache and pink eye.   The cost for a Video Vi at a hospital emergency room. Average cost  $2,300*   *Cost varies based on your insurance coverage  For more information about hours, locations or appointment options available at [facility],  visit: [email or web address] or call [phone] [email or web address]. (1